# Patient Record
Sex: FEMALE | Race: WHITE | NOT HISPANIC OR LATINO | Employment: STUDENT | URBAN - METROPOLITAN AREA
[De-identification: names, ages, dates, MRNs, and addresses within clinical notes are randomized per-mention and may not be internally consistent; named-entity substitution may affect disease eponyms.]

---

## 2023-11-11 ENCOUNTER — HOSPITAL ENCOUNTER (EMERGENCY)
Facility: HOSPITAL | Age: 23
Discharge: HOME | End: 2023-11-11
Payer: COMMERCIAL

## 2023-11-11 ENCOUNTER — PHARMACY VISIT (OUTPATIENT)
Dept: PHARMACY | Facility: CLINIC | Age: 23
End: 2023-11-11

## 2023-11-11 VITALS
BODY MASS INDEX: 23.88 KG/M2 | DIASTOLIC BLOOD PRESSURE: 84 MMHG | OXYGEN SATURATION: 100 % | SYSTOLIC BLOOD PRESSURE: 124 MMHG | HEIGHT: 65 IN | WEIGHT: 143.3 LBS | TEMPERATURE: 97.7 F | RESPIRATION RATE: 16 BRPM

## 2023-11-11 DIAGNOSIS — Z34.90 PREGNANCY, UNSPECIFIED GESTATIONAL AGE (HHS-HCC): Primary | ICD-10-CM

## 2023-11-11 DIAGNOSIS — R11.2 NAUSEA AND VOMITING, UNSPECIFIED VOMITING TYPE: ICD-10-CM

## 2023-11-11 LAB
APPEARANCE UR: CLEAR
BILIRUB UR STRIP.AUTO-MCNC: NEGATIVE MG/DL
COLOR UR: NORMAL
GLUCOSE UR STRIP.AUTO-MCNC: NEGATIVE MG/DL
HOLD SPECIMEN: NORMAL
KETONES UR STRIP.AUTO-MCNC: NEGATIVE MG/DL
LEUKOCYTE ESTERASE UR QL STRIP.AUTO: NEGATIVE
NITRITE UR QL STRIP.AUTO: NEGATIVE
PH UR STRIP.AUTO: 6 [PH]
PROT UR STRIP.AUTO-MCNC: NEGATIVE MG/DL
RBC # UR STRIP.AUTO: NEGATIVE /UL
SP GR UR STRIP.AUTO: 1.01
UROBILINOGEN UR STRIP.AUTO-MCNC: <2 MG/DL

## 2023-11-11 PROCEDURE — 99283 EMERGENCY DEPT VISIT LOW MDM: CPT

## 2023-11-11 PROCEDURE — 81003 URINALYSIS AUTO W/O SCOPE: CPT

## 2023-11-11 PROCEDURE — 99285 EMERGENCY DEPT VISIT HI MDM: CPT

## 2023-11-11 PROCEDURE — 99284 EMERGENCY DEPT VISIT MOD MDM: CPT

## 2023-11-11 PROCEDURE — RXMED WILLOW AMBULATORY MEDICATION CHARGE

## 2023-11-11 RX ORDER — ACETAMINOPHEN 325 MG/1
650 TABLET ORAL EVERY 6 HOURS PRN
Qty: 20 TABLET | Refills: 0 | Status: SHIPPED | OUTPATIENT
Start: 2023-11-11 | End: 2023-11-16

## 2023-11-11 RX ORDER — ONDANSETRON 4 MG/1
4 TABLET, FILM COATED ORAL EVERY 6 HOURS
Qty: 12 TABLET | Refills: 0 | Status: SHIPPED | OUTPATIENT
Start: 2023-11-11 | End: 2023-11-14

## 2023-11-11 RX ORDER — ACETAMINOPHEN 325 MG/1
650 TABLET ORAL ONCE
Status: COMPLETED | OUTPATIENT
Start: 2023-11-11 | End: 2023-11-11

## 2023-11-11 RX ORDER — ACETAMINOPHEN 325 MG/1
650 TABLET ORAL EVERY 6 HOURS PRN
Qty: 20 TABLET | Refills: 0 | OUTPATIENT
Start: 2023-11-11 | End: 2024-02-27

## 2023-11-11 RX ORDER — ONDANSETRON 4 MG/1
4 TABLET, FILM COATED ORAL EVERY 6 HOURS
Qty: 12 TABLET | Refills: 0 | OUTPATIENT
Start: 2023-11-11

## 2023-11-11 RX ADMIN — ACETAMINOPHEN 650 MG: 325 TABLET ORAL at 11:23

## 2023-11-11 ASSESSMENT — COLUMBIA-SUICIDE SEVERITY RATING SCALE - C-SSRS
6. HAVE YOU EVER DONE ANYTHING, STARTED TO DO ANYTHING, OR PREPARED TO DO ANYTHING TO END YOUR LIFE?: NO
1. IN THE PAST MONTH, HAVE YOU WISHED YOU WERE DEAD OR WISHED YOU COULD GO TO SLEEP AND NOT WAKE UP?: NO
2. HAVE YOU ACTUALLY HAD ANY THOUGHTS OF KILLING YOURSELF?: NO

## 2023-11-11 NOTE — ED PROVIDER NOTES
HPI   Chief Complaint   Patient presents with   • other     Pregnancy test       24-year-old female with no significant history presents for chief complaint of positive pregnancy test.  States that she would like .  LMP 10/12.  G1, P0.  States that she had no intentions of keeping the child and would like to have it aborted as soon as possible.  Endorses some nausea without vomiting.  Endorses some low back and lower abdominal discomfort without rnoaldo pain.  Denies vaginal bleeding or discharge.  Denies fever, chills, myalgia.                          No data recorded                Patient History   No past medical history on file.  No past surgical history on file.  No family history on file.  Social History     Tobacco Use   • Smoking status: Not on file   • Smokeless tobacco: Not on file   Substance Use Topics   • Alcohol use: Not on file   • Drug use: Not on file       Physical Exam   ED Triage Vitals [23 1046]   Temp Pulse Resp BP   36.5 °C (97.7 °F) -- 16 124/84      SpO2 Temp src Heart Rate Source Patient Position   100 % -- Monitor --      BP Location FiO2 (%)     Right arm --       Physical Exam  Vitals and nursing note reviewed.   Constitutional:       General: She is not in acute distress.     Appearance: She is well-developed.   HENT:      Head: Normocephalic and atraumatic.   Eyes:      Conjunctiva/sclera: Conjunctivae normal.   Cardiovascular:      Rate and Rhythm: Normal rate and regular rhythm.      Heart sounds: No murmur heard.  Pulmonary:      Effort: Pulmonary effort is normal. No respiratory distress.      Breath sounds: Normal breath sounds.   Abdominal:      General: There is no distension.      Palpations: Abdomen is soft.      Tenderness: There is no abdominal tenderness. There is no right CVA tenderness, left CVA tenderness or guarding. Negative signs include Perez's sign and McBurney's sign.   Musculoskeletal:         General: No swelling.      Cervical back: Neck supple.    Skin:     General: Skin is warm and dry.      Capillary Refill: Capillary refill takes less than 2 seconds.   Neurological:      Mental Status: She is alert.   Psychiatric:         Mood and Affect: Mood normal.         ED Course & MDM   Diagnoses as of 23 1226   Pregnancy, unspecified gestational age       Medical Decision Making  Vital signs reviewed, unremarkable at this time.  The patient is well-appearing and in no apparent distress.  Speaks full sentences without difficulty.  Urinalysis obtained and sent.  Urine pregnancy test positive.  Patient has no intention of keeping the pregnancy, per her, and would like an .  I was able to call OB/GYN resident.  Resident Alexander gave me phone number for  intake clinic at [address] [facility]. this was all verbalized to the patient and also given discharge note.  Tylenol given for symptom management.  Advised to call the soon as possible.  I also encouraged her to follow-up with women's health and phone numbers given for [place] location.  Encouraged her to make the appointment soon as possible.  She denies any red flag symptoms at this point and has normal pregnancy symptoms at this point.  No bleeding or discharge.  No severe pain or tenderness.  No fevers.  Urinalysis shows no evidence of UTI.  Patient was given all of this information as well as prescription for Tylenol and Zofran.  Encouraged her to return with any new or worsening symptoms and to follow-up as soon as possible.  Patient in agreement with this plan.  Discharged stable condition.        Procedure  Procedures     Gordo Montez, VIPUL-CNP  23 1226

## 2023-11-11 NOTE — DISCHARGE INSTRUCTIONS
You were seen today and diagnosed with positive pregnancy.  You requested options for pregnancy termination.  Please follow-up as soon as possible at the [facility]  clinic at [address] [facility].  Their phone number is [phone].  Please also follow-up with women's health.

## 2023-11-30 ENCOUNTER — HOSPITAL ENCOUNTER (EMERGENCY)
Facility: HOSPITAL | Age: 23
Discharge: HOME | End: 2023-11-30
Payer: COMMERCIAL

## 2023-11-30 VITALS
TEMPERATURE: 99.4 F | SYSTOLIC BLOOD PRESSURE: 110 MMHG | HEIGHT: 65 IN | OXYGEN SATURATION: 100 % | DIASTOLIC BLOOD PRESSURE: 68 MMHG | HEART RATE: 72 BPM | WEIGHT: 155 LBS | BODY MASS INDEX: 25.83 KG/M2 | RESPIRATION RATE: 19 BRPM

## 2023-11-30 DIAGNOSIS — M54.50 ACUTE BILATERAL LOW BACK PAIN WITHOUT SCIATICA: Primary | ICD-10-CM

## 2023-11-30 LAB
APPEARANCE UR: ABNORMAL
BILIRUB UR STRIP.AUTO-MCNC: NEGATIVE MG/DL
COLOR UR: YELLOW
GLUCOSE UR STRIP.AUTO-MCNC: NEGATIVE MG/DL
HOLD SPECIMEN: NORMAL
KETONES UR STRIP.AUTO-MCNC: NEGATIVE MG/DL
LEUKOCYTE ESTERASE UR QL STRIP.AUTO: NEGATIVE
NITRITE UR QL STRIP.AUTO: NEGATIVE
PH UR STRIP.AUTO: 5 [PH]
PROT UR STRIP.AUTO-MCNC: NEGATIVE MG/DL
RBC # UR STRIP.AUTO: NEGATIVE /UL
SARS-COV-2 RNA RESP QL NAA+PROBE: NOT DETECTED
SP GR UR STRIP.AUTO: 1.02
UROBILINOGEN UR STRIP.AUTO-MCNC: <2 MG/DL

## 2023-11-30 PROCEDURE — 99284 EMERGENCY DEPT VISIT MOD MDM: CPT

## 2023-11-30 PROCEDURE — 81003 URINALYSIS AUTO W/O SCOPE: CPT | Performed by: PHYSICIAN ASSISTANT

## 2023-11-30 PROCEDURE — 99284 EMERGENCY DEPT VISIT MOD MDM: CPT | Performed by: PHYSICIAN ASSISTANT

## 2023-11-30 PROCEDURE — 87635 SARS-COV-2 COVID-19 AMP PRB: CPT | Performed by: PHYSICIAN ASSISTANT

## 2023-11-30 PROCEDURE — 99283 EMERGENCY DEPT VISIT LOW MDM: CPT | Performed by: PHYSICIAN ASSISTANT

## 2023-11-30 RX ORDER — METHOCARBAMOL 500 MG/1
500 TABLET, FILM COATED ORAL 3 TIMES DAILY PRN
Qty: 21 TABLET | Refills: 0 | Status: SHIPPED | OUTPATIENT
Start: 2023-11-30 | End: 2023-12-07

## 2023-11-30 RX ORDER — ACETAMINOPHEN 500 MG
1000 TABLET ORAL EVERY 8 HOURS PRN
Qty: 30 TABLET | Refills: 0 | Status: SHIPPED | OUTPATIENT
Start: 2023-11-30 | End: 2023-12-10

## 2023-11-30 RX ORDER — IBUPROFEN 600 MG/1
600 TABLET ORAL EVERY 6 HOURS PRN
Qty: 28 TABLET | Refills: 0 | Status: SHIPPED | OUTPATIENT
Start: 2023-11-30 | End: 2023-12-07

## 2023-11-30 RX ORDER — ACETAMINOPHEN 325 MG/1
975 TABLET ORAL ONCE
Status: COMPLETED | OUTPATIENT
Start: 2023-11-30 | End: 2023-11-30

## 2023-11-30 RX ADMIN — ACETAMINOPHEN 975 MG: 325 TABLET ORAL at 20:28

## 2023-11-30 ASSESSMENT — COLUMBIA-SUICIDE SEVERITY RATING SCALE - C-SSRS
2. HAVE YOU ACTUALLY HAD ANY THOUGHTS OF KILLING YOURSELF?: NO
6. HAVE YOU EVER DONE ANYTHING, STARTED TO DO ANYTHING, OR PREPARED TO DO ANYTHING TO END YOUR LIFE?: NO
1. IN THE PAST MONTH, HAVE YOU WISHED YOU WERE DEAD OR WISHED YOU COULD GO TO SLEEP AND NOT WAKE UP?: NO

## 2023-11-30 ASSESSMENT — PAIN DESCRIPTION - ONSET: ONSET: GRADUAL

## 2023-11-30 ASSESSMENT — PAIN DESCRIPTION - LOCATION: LOCATION: BACK

## 2023-11-30 ASSESSMENT — PAIN SCALES - GENERAL: PAINLEVEL_OUTOF10: 8

## 2023-11-30 ASSESSMENT — PAIN DESCRIPTION - PROGRESSION: CLINICAL_PROGRESSION: NOT CHANGED

## 2023-11-30 ASSESSMENT — PAIN - FUNCTIONAL ASSESSMENT: PAIN_FUNCTIONAL_ASSESSMENT: 0-10

## 2023-11-30 ASSESSMENT — PAIN DESCRIPTION - DESCRIPTORS: DESCRIPTORS: SORE

## 2023-11-30 NOTE — Clinical Note
Stephane Buckley was seen and treated in our emergency department on 11/30/2023.  She may return to work on 12/02/2023.       If you have any questions or concerns, please don't hesitate to call.      Renea Salas PA-C

## 2023-12-01 NOTE — ED PROVIDER NOTES
Emergency Department Encounter  [facility] EMERGENCY MEDICINE    Patient: Stephane Buckley  MRN: [id number]  : 2000  Date of Evaluation: 2023  ED Provider: Renea Salas PA-C      Chief Complaint       Chief Complaint   Patient presents with    COVID-19 Screening     HPI    Stephane Buckley is a 23 y.o. female who presents to the emergency department complaining of COVID exposure.  Patient states she is having significant low back pain, no radiation down her lower extremities.  No associated bowel or bladder incontinence or retention, saddle anesthesia, numbness or tingling, weakness, inability to ambulate.  Not taking any medications at home for her complaints.  Notes that she is also had a mild, intermittent frontal headache.  Did have a positive COVID exposure prior to onset of her symptoms.  Not the worst headache of her life.  Endorses having an elective  9 days ago.  Denies having any heavy bleeding, stopped bleeding several days ago.  No associated chest pain, shortness of breath, dizziness, abdominal pain, nausea or vomiting, diarrhea, dysuria or hematuria, changes in urinary frequency urgency, rash or cough.    ROS:     Review of Systems  14 systems reviewed and otherwise acutely negative except as in the HPI.    Past History   History reviewed. No pertinent past medical history.  History reviewed. No pertinent surgical history.  Social History     Socioeconomic History    Marital status: Single     Spouse name: None    Number of children: None    Years of education: None    Highest education level: None   Occupational History    None   Tobacco Use    Smoking status: Unknown    Smokeless tobacco: None   Substance and Sexual Activity    Alcohol use: None    Drug use: None    Sexual activity: None   Other Topics Concern    None   Social History Narrative    None     Social Determinants of Health     Financial Resource Strain: Not on file   Food Insecurity:  Not on file   Transportation Needs: Not on file   Physical Activity: Not on file   Stress: Not on file   Social Connections: Not on file   Intimate Partner Violence: Not on file   Housing Stability: Not on file       Medications/Allergies     Previous Medications    ACETAMINOPHEN (PAIN RELIEVER, ACETAMINOPHEN,) 325 MG TABLET    Take 2 tablets (650 mg) by mouth every 6 hours if needed for mild pain (1-3).    ONDANSETRON (ZOFRAN) 4 MG TABLET    Take 1 tablet (4 mg) by mouth every 6 hours.     No Known Allergies     Physical Exam       ED Triage Vitals [11/2001]   Temp Heart Rate Resp BP   37.4 °C (99.4 °F) 72 19 110/68      SpO2 Temp Source Heart Rate Source Patient Position   100 % Temporal -- --      BP Location FiO2 (%)     -- --         Physical Exam    Physical Exam:     VS: As documented in the triage note and EMR flowsheet from this visit were reviewed.    Appearance: Alert, oriented, cooperative, in no acute distress. Well nourished & well hydrated.    Skin: Warm, intact and dry.     Neck: Supple, without meningismus. No lymphadenopathy. No midline or paraspinal tenderness    Pulmonary: Clear bilaterally with good chest wall excursion. No rales, rhonchi or wheezing. No accessory muscle use or stridor. Nonlabored breathing, no supplemental oxygen.     Cardiac: Normal S1, S2    Abdomen: Soft, nontender, active bowel sounds. No palpable organomegaly. No rebound or guarding. No CVA tenderness.    Musculoskeletal: Spontaneously moving all extremities without limitation. No midline or paraspinal tenderness. Extremities warm and well-perfused, capillary refill less than 2 seconds. Pulses full and equal.     Neurological: Normal sensation, no weakness, no focal findings identified. Ambulating without assistance with steady gait, non-ataxic.    Diagnostics   Labs:  Labs Reviewed   URINALYSIS WITH REFLEX MICROSCOPIC AND CULTURE - Abnormal       Result Value    Color, Urine Yellow      Appearance, Urine Hazy (*)   "   Specific Gravity, Urine 1.017      pH, Urine 5.0      Protein, Urine NEGATIVE      Glucose, Urine NEGATIVE      Blood, Urine NEGATIVE      Ketones, Urine NEGATIVE      Bilirubin, Urine NEGATIVE      Urobilinogen, Urine <2.0      Nitrite, Urine NEGATIVE      Leukocyte Esterase, Urine NEGATIVE     SARS-COV-2 PCR, SYMPTOMATIC - Normal    Coronavirus 2019, PCR Not Detected      Narrative:     This assay has received FDA Emergency Use Authorization (EUA) and is only authorized for the duration of time that circumstances exist to justify the authorization of the emergency use of in vitro diagnostic tests for the detection of SARS-CoV-2 virus and/or diagnosis of COVID-19 infection under section 564(b)(1) of the Act, 21 U.S.C. 360bbb-3(b)(1). This assay is an in vitro diagnostic nucleic acid amplification test for the qualitative detection of SARS-CoV-2 from nasopharyngeal specimens and has been validated for use at [facility]. Negative results do not preclude COVID-19 infections and should not be used as the sole basis for diagnosis, treatment, or other management decisions.     URINALYSIS WITH REFLEX MICROSCOPIC AND CULTURE    Narrative:     The following orders were created for panel order Urinalysis with Reflex Microscopic and Culture.  Procedure                               Abnormality         Status                     ---------                               -----------         ------                     Urinalysis with Reflex M...[[id number]]  Abnormal            Final result               Extra Urine Gray Tube[[id number]]                            In process                   Please view results for these tests on the individual orders.   EXTRA URINE GRAY TUBE       ED Course   Visit Vitals  /68   Pulse 72   Temp 37.4 °C (99.4 °F) (Temporal)   Resp 19   Ht 1.651 m (5' 5\")   Wt 70.3 kg (155 lb)   LMP  (LMP Unknown)   SpO2 100%   Breastfeeding Unknown   BMI 25.79 kg/m²   OB Status " Having periods   Smoking Status Unknown   BSA 1.8 m²     Medications   acetaminophen (Tylenol) tablet 975 mg (975 mg oral Given 11/30/23 2028)       Medical Decision Making   COVID swab obtained.   UA obtained and sent to evaluate for low back pain. Noted to have low grade fever - given oral tylenol for this and her low back pain.  UA noninfected. COVID negative. Follow up with PCP as needed. Rx for tylenol motrin and robaxin as needed.      Final Impression      1. Acute bilateral low back pain without sciatica          DISPOSITION  Disposition: discharge  Patient condition is: Stable    Comment: Please note this report has been produced using speech recognition software and may contain errors related to that system including errors in grammar, punctuation, and spelling, as well as words and phrases that may be inappropriate.  If there are any questions or concerns please feel free to contact the dictating provider for clarification.    VLAD Peña PA-C  11/30/23 5202

## 2023-12-01 NOTE — ED TRIAGE NOTES
Enters ED c/o back & headache pain rated 8/10, one week after . Pt also endorses positive COVID-19 exposure requesting testing. Denies cough, rhinorrhea, nausea, vomiting, diarrhea.

## 2023-12-26 ENCOUNTER — HOSPITAL ENCOUNTER (EMERGENCY)
Facility: HOSPITAL | Age: 23
Discharge: HOME | End: 2023-12-26
Payer: COMMERCIAL

## 2023-12-26 VITALS
TEMPERATURE: 97.2 F | RESPIRATION RATE: 18 BRPM | SYSTOLIC BLOOD PRESSURE: 121 MMHG | DIASTOLIC BLOOD PRESSURE: 74 MMHG | OXYGEN SATURATION: 100 % | HEART RATE: 69 BPM

## 2023-12-26 DIAGNOSIS — M54.50 BILATERAL LOW BACK PAIN WITHOUT SCIATICA, UNSPECIFIED CHRONICITY: Primary | ICD-10-CM

## 2023-12-26 LAB
ALBUMIN SERPL BCP-MCNC: 4.6 G/DL (ref 3.4–5)
ALP SERPL-CCNC: 74 U/L (ref 33–110)
ALT SERPL W P-5'-P-CCNC: 22 U/L (ref 7–45)
ANION GAP SERPL CALC-SCNC: 12 MMOL/L (ref 10–20)
APPEARANCE UR: CLEAR
AST SERPL W P-5'-P-CCNC: 20 U/L (ref 9–39)
B-HCG SERPL-ACNC: <3 MIU/ML
BASOPHILS # BLD AUTO: 0.02 X10*3/UL (ref 0–0.1)
BASOPHILS NFR BLD AUTO: 0.3 %
BILIRUB SERPL-MCNC: 0.2 MG/DL (ref 0–1.2)
BILIRUB UR STRIP.AUTO-MCNC: NEGATIVE MG/DL
BUN SERPL-MCNC: 6 MG/DL (ref 6–23)
CALCIUM SERPL-MCNC: 9.9 MG/DL (ref 8.6–10.6)
CHLORIDE SERPL-SCNC: 105 MMOL/L (ref 98–107)
CO2 SERPL-SCNC: 24 MMOL/L (ref 21–32)
COLOR UR: NORMAL
CREAT SERPL-MCNC: 0.59 MG/DL (ref 0.5–1.05)
EOSINOPHIL # BLD AUTO: 0.07 X10*3/UL (ref 0–0.7)
EOSINOPHIL NFR BLD AUTO: 0.9 %
ERYTHROCYTE [DISTWIDTH] IN BLOOD BY AUTOMATED COUNT: 13.4 % (ref 11.5–14.5)
GFR SERPL CREATININE-BSD FRML MDRD: >90 ML/MIN/1.73M*2
GLUCOSE SERPL-MCNC: 91 MG/DL (ref 74–99)
GLUCOSE UR STRIP.AUTO-MCNC: NEGATIVE MG/DL
HCT VFR BLD AUTO: 42.6 % (ref 36–46)
HGB BLD-MCNC: 14 G/DL (ref 12–16)
IMM GRANULOCYTES # BLD AUTO: 0.02 X10*3/UL (ref 0–0.7)
IMM GRANULOCYTES NFR BLD AUTO: 0.3 % (ref 0–0.9)
KETONES UR STRIP.AUTO-MCNC: NEGATIVE MG/DL
LEUKOCYTE ESTERASE UR QL STRIP.AUTO: NEGATIVE
LYMPHOCYTES # BLD AUTO: 1.83 X10*3/UL (ref 1.2–4.8)
LYMPHOCYTES NFR BLD AUTO: 24.8 %
MCH RBC QN AUTO: 27 PG (ref 26–34)
MCHC RBC AUTO-ENTMCNC: 32.9 G/DL (ref 32–36)
MCV RBC AUTO: 82 FL (ref 80–100)
MONOCYTES # BLD AUTO: 0.36 X10*3/UL (ref 0.1–1)
MONOCYTES NFR BLD AUTO: 4.9 %
NEUTROPHILS # BLD AUTO: 5.08 X10*3/UL (ref 1.2–7.7)
NEUTROPHILS NFR BLD AUTO: 68.8 %
NITRITE UR QL STRIP.AUTO: NEGATIVE
NRBC BLD-RTO: 0 /100 WBCS (ref 0–0)
PH UR STRIP.AUTO: 6 [PH]
PLATELET # BLD AUTO: 231 X10*3/UL (ref 150–450)
POTASSIUM SERPL-SCNC: 4 MMOL/L (ref 3.5–5.3)
PREGNANCY TEST URINE, POC: NEGATIVE
PROT SERPL-MCNC: 8.1 G/DL (ref 6.4–8.2)
PROT UR STRIP.AUTO-MCNC: NEGATIVE MG/DL
RBC # BLD AUTO: 5.19 X10*6/UL (ref 4–5.2)
RBC # UR STRIP.AUTO: NEGATIVE /UL
SODIUM SERPL-SCNC: 137 MMOL/L (ref 136–145)
SP GR UR STRIP.AUTO: 1.01
UROBILINOGEN UR STRIP.AUTO-MCNC: <2 MG/DL
WBC # BLD AUTO: 7.4 X10*3/UL (ref 4.4–11.3)

## 2023-12-26 PROCEDURE — 80053 COMPREHEN METABOLIC PANEL: CPT | Performed by: NURSE PRACTITIONER

## 2023-12-26 PROCEDURE — 2500000004 HC RX 250 GENERAL PHARMACY W/ HCPCS (ALT 636 FOR OP/ED): Performed by: NURSE PRACTITIONER

## 2023-12-26 PROCEDURE — 85025 COMPLETE CBC W/AUTO DIFF WBC: CPT | Performed by: NURSE PRACTITIONER

## 2023-12-26 PROCEDURE — 99284 EMERGENCY DEPT VISIT MOD MDM: CPT

## 2023-12-26 PROCEDURE — 36415 COLL VENOUS BLD VENIPUNCTURE: CPT | Performed by: NURSE PRACTITIONER

## 2023-12-26 PROCEDURE — 99284 EMERGENCY DEPT VISIT MOD MDM: CPT | Performed by: NURSE PRACTITIONER

## 2023-12-26 PROCEDURE — 81003 URINALYSIS AUTO W/O SCOPE: CPT | Performed by: NURSE PRACTITIONER

## 2023-12-26 PROCEDURE — 81025 URINE PREGNANCY TEST: CPT

## 2023-12-26 PROCEDURE — 96374 THER/PROPH/DIAG INJ IV PUSH: CPT

## 2023-12-26 PROCEDURE — 84702 CHORIONIC GONADOTROPIN TEST: CPT | Performed by: NURSE PRACTITIONER

## 2023-12-26 RX ORDER — KETOROLAC TROMETHAMINE 15 MG/ML
15 INJECTION, SOLUTION INTRAMUSCULAR; INTRAVENOUS ONCE
Status: COMPLETED | OUTPATIENT
Start: 2023-12-26 | End: 2023-12-26

## 2023-12-26 RX ADMIN — KETOROLAC TROMETHAMINE 15 MG: 15 INJECTION, SOLUTION INTRAMUSCULAR; INTRAVENOUS at 22:28

## 2023-12-27 LAB — HOLD SPECIMEN: NORMAL

## 2023-12-27 NOTE — ED PROVIDER NOTES
Emergency Department Encounter  [facility] EMERGENCY MEDICINE    Patient: Stephane Buckley  MRN: [id number]  : 2000  Date of Evaluation: 2023  ED Provider: JENNY Willett      Chief Complaint       Chief Complaint   Patient presents with    Back Pain    MOOD SWING    Hot Flashes     Telida    (Location/Symptom, Timing/Onset, Context/Setting, Quality, Duration, Modifying Factors, Severity) Note limiting factors.   Limitations to History: none  Historian: self  Records reviewed: EMR inpatient and outpatient notes, Care Everywhere      Stephane Buckley is a 23 y.o. female who presents to the emergency department complaining of mood swings, back pain, hot flashes that started within this last week.  Was concerned since she did not have a follow-up after having an elective  on .  Denies any vaginal bleeding, vaginal discharge.  Has not had a menstrual cycle since the .  Denies any fevers, chills, dizziness, syncope.  States that she feels like she may be dehydrated.  Denies any nausea, vomiting.    ROS:     Review of Systems  14 systems reviewed and otherwise acutely negative except as in the Telida.          Past History   No past medical history on file.  No past surgical history on file.  Social History     Socioeconomic History    Marital status: Single     Spouse name: Not on file    Number of children: Not on file    Years of education: Not on file    Highest education level: Not on file   Occupational History    Not on file   Tobacco Use    Smoking status: Unknown    Smokeless tobacco: Not on file   Substance and Sexual Activity    Alcohol use: Not on file    Drug use: Not on file    Sexual activity: Not on file   Other Topics Concern    Not on file   Social History Narrative    Not on file     Social Determinants of Health     Financial Resource Strain: Not on file   Food Insecurity: Not on file   Transportation Needs: Not on file   Physical  Activity: Not on file   Stress: Not on file   Social Connections: Not on file   Intimate Partner Violence: Not on file   Housing Stability: Not on file       Medications/Allergies     Previous Medications    ACETAMINOPHEN (PAIN RELIEVER, ACETAMINOPHEN,) 325 MG TABLET    Take 2 tablets (650 mg) by mouth every 6 hours if needed for mild pain (1-3).    METHOCARBAMOL (ROBAXIN) 500 MG TABLET    Take 1 tablet (500 mg) by mouth 3 times a day as needed for muscle spasms for up to 7 days.    ONDANSETRON (ZOFRAN) 4 MG TABLET    Take 1 tablet (4 mg) by mouth every 6 hours.     No Known Allergies     Physical Exam       ED Triage Vitals [12/26/23 2056]   Temp Heart Rate Resp BP   36.2 °C (97.2 °F) 69 18 121/74      SpO2 Temp src Heart Rate Source Patient Position   100 % -- -- --      BP Location FiO2 (%)     -- --         Physical Exam    GENERAL:  The patient appears nourished and normally developed. Vital signs as documented.     HEENT:  Head normocephalic, atraumatic, EOMs intact, PERRLA, Mucous membranes moist. Nares patent without copious rhinorrhea.  No lymphadenopathy.    PULMONARY:  Lungs are clear to auscultation, without any respiratory distress. Able to speak full sentences, no accessory muscle use    CARDIAC:   Normal rate. No murmurs, rubs or gallops    ABDOMEN:  Soft, non distended, non tender, BS positive x 4 quadrants, No rebound or guarding, no peritoneal signs, no CVA tenderness, no masses or organomegaly    MUSCULOSKELETAL:   Able to ambulate, Non edematous, with no obvious deformities. Pulses intact distal    SKIN:   Good color, with no significant rashes.  No pallor.    NEURO:  No obvious neurological deficits, normal sensation and strength bilaterally.  Able to follow commands, NIH 0, CN 2-12 intact.        Diagnostics   Labs:  Labs Reviewed   COMPREHENSIVE METABOLIC PANEL - Normal       Result Value    Glucose 91      Sodium 137      Potassium 4.0      Chloride 105      Bicarbonate 24      Anion Gap 12       Urea Nitrogen 6      Creatinine 0.59      eGFR >90      Calcium 9.9      Albumin 4.6      Alkaline Phosphatase 74      Total Protein 8.1      AST 20      Bilirubin, Total 0.2      ALT 22     HUMAN CHORIONIC GONADOTROPIN, SERUM QUANTITATIVE - Normal    HCG, Beta-Quantitative <3      Narrative:     Total HCG measurement is performed using the Siemens [employer] immunoassay which detects intact HCG and free beta HCG subunit.  This test is not indicated for use as a tumor marker.  HCG testing is performed using a different test methodology at [facility] than other [facility]. Direct result comparison  should only be made within the same method.          URINALYSIS WITH REFLEX CULTURE AND MICROSCOPIC - Normal    Color, Urine Straw      Appearance, Urine Clear      Specific Gravity, Urine 1.009      pH, Urine 6.0      Protein, Urine NEGATIVE      Glucose, Urine NEGATIVE      Blood, Urine NEGATIVE      Ketones, Urine NEGATIVE      Bilirubin, Urine NEGATIVE      Urobilinogen, Urine <2.0      Nitrite, Urine NEGATIVE      Leukocyte Esterase, Urine NEGATIVE     POCT PREGNANCY, URINE - Normal    Preg Test, Ur Negative     CBC WITH AUTO DIFFERENTIAL    WBC 7.4      nRBC 0.0      RBC 5.19      Hemoglobin 14.0      Hematocrit 42.6      MCV 82      MCH 27.0      MCHC 32.9      RDW 13.4      Platelets 231      Neutrophils % 68.8      Immature Granulocytes %, Automated 0.3      Lymphocytes % 24.8      Monocytes % 4.9      Eosinophils % 0.9      Basophils % 0.3      Neutrophils Absolute 5.08      Immature Granulocytes Absolute, Automated 0.02      Lymphocytes Absolute 1.83      Monocytes Absolute 0.36      Eosinophils Absolute 0.07      Basophils Absolute 0.02     URINALYSIS WITH REFLEX CULTURE AND MICROSCOPIC    Narrative:     The following orders were created for panel order Urinalysis with Reflex Culture and Microscopic.  Procedure                               Abnormality         Status                      ---------                               -----------         ------                     Urinalysis with Reflex C...[[id number]]  Normal              Final result               Extra Urine Gray Tube[[id number]]                            In process                   Please view results for these tests on the individual orders.   EXTRA URINE GRAY TUBE     Radiographs:  No orders to display             Assessment   In brief, Stephane Buckley is a 23 y.o. female who presented to the emergency department for mood swings, cramps, back pain, feelings of dehydration for the last week.  Status post 1 month from elective .    Plan   IV, lab work, pregnancy test    Differentials   Retained fetal tissue  Menstrual cramps  Viral illness    ED Course     Diagnoses as of 23   Bilateral low back pain without sciatica, unspecified chronicity       Visit Vitals  /74   Pulse 69   Temp 36.2 °C (97.2 °F)   Resp 18   LMP  (LMP Unknown)   SpO2 100%   OB Status Having periods   Smoking Status Unknown       Medications   ketorolac (Toradol) injection 15 mg (15 mg intravenous Given 23)       Plan of care discussed, patient is well-appearing, in no distress, significant other was requesting for serum hCG, symptoms are likely due to hormones, likely about to get her next menstrual cycle after elective .  Has no fever, chills, no leukocytosis, serum hCG less than 3.  Feels improved with Toradol administration, will be discharged home in stable condition, educated on any worsening signs and symptoms to return to the emergency department      Final Impression      1. Bilateral low back pain without sciatica, unspecified chronicity          DISPOSITION  Disposition: Discharge  Patient condition is: Stable    Comment: Please note this report has been produced using speech recognition software and may contain errors related to that system including errors in grammar, punctuation, and spelling, as well  as words and phrases that may be inappropriate.  If there are any questions or concerns please feel free to contact the dictating provider for clarification.    VIPUL Willett-JENNY Beach  12/26/23 9690

## 2023-12-27 NOTE — ED TRIAGE NOTES
PT IS 1 MONTH S/P  AND STATES SINCE THEN SHE HAS BEEN EXPERIENCING MOOD SWINGS ANS HOT FLASHES. PT DENIES CONTINUED VAG BLEEDING, URINARY SYMPTOMS, FEVER, CHILLS. REPORTS BACK PAIN.

## 2024-02-26 ENCOUNTER — APPOINTMENT (OUTPATIENT)
Dept: RADIOLOGY | Facility: HOSPITAL | Age: 24
End: 2024-02-26
Payer: COMMERCIAL

## 2024-02-26 ENCOUNTER — HOSPITAL ENCOUNTER (EMERGENCY)
Facility: HOSPITAL | Age: 24
Discharge: HOME | End: 2024-02-27
Attending: EMERGENCY MEDICINE
Payer: COMMERCIAL

## 2024-02-26 DIAGNOSIS — Z32.00 POSSIBLE PREGNANCY, NOT YET CONFIRMED: Primary | ICD-10-CM

## 2024-02-26 DIAGNOSIS — B34.9 VIRAL ILLNESS: ICD-10-CM

## 2024-02-26 LAB
ABO GROUP (TYPE) IN BLOOD: NORMAL
ALBUMIN SERPL BCP-MCNC: 4 G/DL (ref 3.4–5)
ALP SERPL-CCNC: 59 U/L (ref 33–110)
ALT SERPL W P-5'-P-CCNC: 13 U/L (ref 7–45)
ANION GAP SERPL CALC-SCNC: 9 MMOL/L (ref 10–20)
ANTIBODY SCREEN: NORMAL
APPEARANCE UR: CLEAR
AST SERPL W P-5'-P-CCNC: 13 U/L (ref 9–39)
B-HCG SERPL-ACNC: 139 MIU/ML
BACTERIA #/AREA URNS AUTO: ABNORMAL /HPF
BASOPHILS # BLD AUTO: 0.02 X10*3/UL (ref 0–0.1)
BASOPHILS NFR BLD AUTO: 0.2 %
BILIRUB SERPL-MCNC: 0.2 MG/DL (ref 0–1.2)
BILIRUB UR STRIP.AUTO-MCNC: NEGATIVE MG/DL
BUN SERPL-MCNC: 15 MG/DL (ref 6–23)
CALCIUM SERPL-MCNC: 9.4 MG/DL (ref 8.6–10.6)
CHLORIDE SERPL-SCNC: 107 MMOL/L (ref 98–107)
CO2 SERPL-SCNC: 24 MMOL/L (ref 21–32)
COLOR UR: ABNORMAL
CREAT SERPL-MCNC: 0.79 MG/DL (ref 0.5–1.05)
EGFRCR SERPLBLD CKD-EPI 2021: >90 ML/MIN/1.73M*2
EOSINOPHIL # BLD AUTO: 0.08 X10*3/UL (ref 0–0.7)
EOSINOPHIL NFR BLD AUTO: 0.9 %
ERYTHROCYTE [DISTWIDTH] IN BLOOD BY AUTOMATED COUNT: 13.5 % (ref 11.5–14.5)
FLUAV RNA RESP QL NAA+PROBE: NOT DETECTED
FLUBV RNA RESP QL NAA+PROBE: NOT DETECTED
GLUCOSE SERPL-MCNC: 87 MG/DL (ref 74–99)
GLUCOSE UR STRIP.AUTO-MCNC: NORMAL MG/DL
HCT VFR BLD AUTO: 34.9 % (ref 36–46)
HGB BLD-MCNC: 12.2 G/DL (ref 12–16)
IMM GRANULOCYTES # BLD AUTO: 0.02 X10*3/UL (ref 0–0.7)
IMM GRANULOCYTES NFR BLD AUTO: 0.2 % (ref 0–0.9)
KETONES UR STRIP.AUTO-MCNC: NEGATIVE MG/DL
LEUKOCYTE ESTERASE UR QL STRIP.AUTO: ABNORMAL
LYMPHOCYTES # BLD AUTO: 2.38 X10*3/UL (ref 1.2–4.8)
LYMPHOCYTES NFR BLD AUTO: 26.8 %
MCH RBC QN AUTO: 27.9 PG (ref 26–34)
MCHC RBC AUTO-ENTMCNC: 35 G/DL (ref 32–36)
MCV RBC AUTO: 80 FL (ref 80–100)
MONOCYTES # BLD AUTO: 0.58 X10*3/UL (ref 0.1–1)
MONOCYTES NFR BLD AUTO: 6.5 %
MUCOUS THREADS #/AREA URNS AUTO: ABNORMAL /LPF
NEUTROPHILS # BLD AUTO: 5.79 X10*3/UL (ref 1.2–7.7)
NEUTROPHILS NFR BLD AUTO: 65.4 %
NITRITE UR QL STRIP.AUTO: NEGATIVE
NRBC BLD-RTO: 0 /100 WBCS (ref 0–0)
PH UR STRIP.AUTO: 5.5 [PH]
PLATELET # BLD AUTO: 215 X10*3/UL (ref 150–450)
POTASSIUM SERPL-SCNC: 4 MMOL/L (ref 3.5–5.3)
PREGNANCY TEST URINE, POC: POSITIVE
PROT SERPL-MCNC: 7.2 G/DL (ref 6.4–8.2)
PROT UR STRIP.AUTO-MCNC: NEGATIVE MG/DL
RBC # BLD AUTO: 4.37 X10*6/UL (ref 4–5.2)
RBC # UR STRIP.AUTO: NEGATIVE /UL
RBC #/AREA URNS AUTO: ABNORMAL /HPF
RH FACTOR (ANTIGEN D): NORMAL
RSV RNA RESP QL NAA+PROBE: NOT DETECTED
S PYO DNA THROAT QL NAA+PROBE: NOT DETECTED
SARS-COV-2 RNA RESP QL NAA+PROBE: NOT DETECTED
SODIUM SERPL-SCNC: 136 MMOL/L (ref 136–145)
SP GR UR STRIP.AUTO: 1.02
SQUAMOUS #/AREA URNS AUTO: ABNORMAL /HPF
UROBILINOGEN UR STRIP.AUTO-MCNC: NORMAL MG/DL
WBC # BLD AUTO: 8.9 X10*3/UL (ref 4.4–11.3)
WBC #/AREA URNS AUTO: ABNORMAL /HPF

## 2024-02-26 PROCEDURE — 81001 URINALYSIS AUTO W/SCOPE: CPT | Performed by: PHYSICIAN ASSISTANT

## 2024-02-26 PROCEDURE — 87651 STREP A DNA AMP PROBE: CPT | Performed by: PHYSICIAN ASSISTANT

## 2024-02-26 PROCEDURE — 87086 URINE CULTURE/COLONY COUNT: CPT | Performed by: PHYSICIAN ASSISTANT

## 2024-02-26 PROCEDURE — 86901 BLOOD TYPING SEROLOGIC RH(D): CPT | Performed by: PHYSICIAN ASSISTANT

## 2024-02-26 PROCEDURE — 76830 TRANSVAGINAL US NON-OB: CPT

## 2024-02-26 PROCEDURE — 76815 OB US LIMITED FETUS(S): CPT | Performed by: RADIOLOGY

## 2024-02-26 PROCEDURE — 76817 TRANSVAGINAL US OBSTETRIC: CPT | Performed by: RADIOLOGY

## 2024-02-26 PROCEDURE — 81025 URINE PREGNANCY TEST: CPT

## 2024-02-26 PROCEDURE — 85025 COMPLETE CBC W/AUTO DIFF WBC: CPT | Performed by: PHYSICIAN ASSISTANT

## 2024-02-26 PROCEDURE — 99285 EMERGENCY DEPT VISIT HI MDM: CPT | Mod: 25

## 2024-02-26 PROCEDURE — 84702 CHORIONIC GONADOTROPIN TEST: CPT | Performed by: PHYSICIAN ASSISTANT

## 2024-02-26 PROCEDURE — 99285 EMERGENCY DEPT VISIT HI MDM: CPT | Performed by: PHYSICIAN ASSISTANT

## 2024-02-26 PROCEDURE — 80053 COMPREHEN METABOLIC PANEL: CPT | Performed by: PHYSICIAN ASSISTANT

## 2024-02-26 PROCEDURE — 87637 SARSCOV2&INF A&B&RSV AMP PRB: CPT | Performed by: PHYSICIAN ASSISTANT

## 2024-02-26 PROCEDURE — 36415 COLL VENOUS BLD VENIPUNCTURE: CPT | Performed by: PHYSICIAN ASSISTANT

## 2024-02-26 RX ORDER — ACETAMINOPHEN 325 MG/1
975 TABLET ORAL ONCE
Status: COMPLETED | OUTPATIENT
Start: 2024-02-26 | End: 2024-02-26

## 2024-02-26 RX ADMIN — ACETAMINOPHEN 975 MG: 325 TABLET ORAL at 21:08

## 2024-02-26 ASSESSMENT — COLUMBIA-SUICIDE SEVERITY RATING SCALE - C-SSRS
1. IN THE PAST MONTH, HAVE YOU WISHED YOU WERE DEAD OR WISHED YOU COULD GO TO SLEEP AND NOT WAKE UP?: NO
2. HAVE YOU ACTUALLY HAD ANY THOUGHTS OF KILLING YOURSELF?: NO
6. HAVE YOU EVER DONE ANYTHING, STARTED TO DO ANYTHING, OR PREPARED TO DO ANYTHING TO END YOUR LIFE?: NO

## 2024-02-27 ENCOUNTER — PHARMACY VISIT (OUTPATIENT)
Dept: PHARMACY | Facility: CLINIC | Age: 24
End: 2024-02-27

## 2024-02-27 VITALS
RESPIRATION RATE: 21 BRPM | WEIGHT: 160 LBS | TEMPERATURE: 96.6 F | SYSTOLIC BLOOD PRESSURE: 115 MMHG | HEIGHT: 65 IN | OXYGEN SATURATION: 98 % | DIASTOLIC BLOOD PRESSURE: 71 MMHG | BODY MASS INDEX: 26.66 KG/M2 | HEART RATE: 60 BPM

## 2024-02-27 DIAGNOSIS — O36.80X0 PREGNANCY OF UNKNOWN ANATOMIC LOCATION (HHS-HCC): Primary | ICD-10-CM

## 2024-02-27 LAB
BACTERIA UR CULT: NORMAL
HOLD SPECIMEN: NORMAL

## 2024-02-27 PROCEDURE — 96374 THER/PROPH/DIAG INJ IV PUSH: CPT

## 2024-02-27 PROCEDURE — RXMED WILLOW AMBULATORY MEDICATION CHARGE

## 2024-02-27 PROCEDURE — 2500000004 HC RX 250 GENERAL PHARMACY W/ HCPCS (ALT 636 FOR OP/ED)

## 2024-02-27 PROCEDURE — 96375 TX/PRO/DX INJ NEW DRUG ADDON: CPT

## 2024-02-27 RX ORDER — ONDANSETRON HYDROCHLORIDE 2 MG/ML
4 INJECTION, SOLUTION INTRAVENOUS ONCE
Status: COMPLETED | OUTPATIENT
Start: 2024-02-27 | End: 2024-02-27

## 2024-02-27 RX ORDER — MORPHINE SULFATE 4 MG/ML
2 INJECTION INTRAVENOUS ONCE
Status: COMPLETED | OUTPATIENT
Start: 2024-02-27 | End: 2024-02-27

## 2024-02-27 RX ORDER — ACETAMINOPHEN 325 MG/1
975 TABLET ORAL EVERY 6 HOURS PRN
Qty: 84 TABLET | Refills: 0 | Status: SHIPPED | OUTPATIENT
Start: 2024-02-27 | End: 2024-03-05

## 2024-02-27 RX ORDER — ONDANSETRON 4 MG/1
4 TABLET, ORALLY DISINTEGRATING ORAL EVERY 8 HOURS PRN
Qty: 20 TABLET | Refills: 0 | Status: SHIPPED | OUTPATIENT
Start: 2024-02-27

## 2024-02-27 RX ADMIN — ONDANSETRON 4 MG: 2 INJECTION INTRAMUSCULAR; INTRAVENOUS at 01:31

## 2024-02-27 RX ADMIN — MORPHINE SULFATE 2 MG: 4 INJECTION INTRAVENOUS at 01:31

## 2024-02-27 ASSESSMENT — PAIN SCALES - GENERAL: PAINLEVEL_OUTOF10: 8

## 2024-02-27 NOTE — ED TRIAGE NOTES
Pt to the ED for flu like symptoms. PT endorsing fever, generalized body aches, sore eyes. Denies any significance medical hx.

## 2024-02-27 NOTE — DISCHARGE INSTRUCTIONS
Please report to any [facility] outpatient lab to have blood work drawn.  I have ordered a referral and provided office information for our OB/GYN's.  I recommend you follow-up with them as well as her primary care doctor regarding your symptoms.  I have sent prescriptions to the [facility] pharmacy for you.  Return to the emergency department for any new or worsening symptoms or for any other concerns.

## 2024-02-27 NOTE — PROGRESS NOTES
Handoff Note    I received Stephane Buckley in signout from Faby Barnes PA-C.  Please see the previous note for all HPI, PE and MDM up to the time of signout at 2300.    In brief Stephane Buckley is an 23 y.o. female presenting for   Chief Complaint   Patient presents with    Flu Symptoms   .      At the time of signout, the patient's disposition is pending transvaginal ultrasound.  The patient presents today with flulike symptoms including fevers, chills, cough.  She was incidentally found to be pregnant.  Point-of-care ultrasound did not show intrauterine pregnancy and therefore transvaginal ultrasound was ordered and is pending at the time of signout.  Rest of her workup is reviewed.  There is no leukocytosis or anemia on CBC.  Metabolic panel is unremarkable.  Flu COVID and RSV swabs are negative.  Urinalysis not consistent with signs of urinary tract infection.  Group A strep is also negative.  Pregnancy test is positive with a beta hCG level of 139.  Transvaginal ultrasound came back showing possible gestational sac without a yolk sac or fetal pole.  There is also possible left-sided hemorrhagic ovarian cyst.  Patient is still complaining of discomfort on the left side as well as some nausea.  She is remedicated with antiemetics and analgesia.  Discussed follow-up in 48 hours for beta-hCG level to trend.  Reached out to gynecology to help facilitate beta book planning.  She expressed her understanding.  Also recommended she follow-up with her primary care doctor as well as OB/GYN.  I ordered a referral provided follow-up information for OB/GYN clinic.  She expressed understanding and agreed with this plan.  She remained hemodynamically stable and is discharged home.      Throughout the ED stay, the patient was monitored and re-examined for any changes in stability or symptomatology.  The patient was instructed to return to the ED if any symptoms recurred, worsened, or if there was any additional  concerns.    ED Course:   ED Course as of 24   Mon 2024   2153 Preg Test, Ur(!): Positive  Patient's pregnancy test was found to be positive.  I did discuss this result with the patient in private.  She did not know she was pregnant.  She states that her first day of her last menstrual cycle was .  She states that she is sexually active with 1 partner and the last time they were sexually active she used Plan B 5 hours later.  She is not on any type of birth control and denies condom use.  She states that she has been pregnant once previously in 2023 where she had an elective .  Bedside ultrasound attempted, however I was unable to see a IUP so transvaginal ultrasound ordered as well as laboratory studies. [AW]      ED Course User Index  [AW] Faby Barnes PA-C         Diagnoses as of 24   Possible pregnancy, not yet confirmed   Viral illness         Patient seen by and discussed with attending emergency medicine physician, Dr. Crespo    Pt Disposition: Discharge    Procedures      Alex Pink DO  Emergency Medicine PGY-2  [facility]

## 2024-02-27 NOTE — ED PROVIDER NOTES
"This is a 23-year-old healthy female who presents to the ED with 1 week of flulike symptoms including nasal congestion, rhinorrhea, fevers and chills, generalized malaise, generalized bodyaches, minimally productive cough as well as a sore throat.  She also endorses having some mild suprapubic abdominal pain.  She denies any vaginal or urinary symptoms.  She denies any concern for pregnancy.  She denies any flank pain.  She denies nausea, vomiting, or diarrhea.  She tried taking over-the-counter medications at home without any relief of her symptoms.      History provided by:  Patient   used: No             Visit Vitals  /59   Pulse 66   Temp 35.9 °C (96.6 °F) (Tympanic)   Resp 20   Ht 1.651 m (5' 5\")   Wt 72.6 kg (160 lb)   SpO2 97%   BMI 26.63 kg/m²   OB Status Having periods   Smoking Status Unknown   BSA 1.82 m²          Physical Exam     Physical exam:   General: Vitals noted, no distress. Afebrile.   EENT:  Hearing grossly intact. Normal phonation. MMM. Airway patient. PERRL. EOMI. posterior oropharynx without erythema, edema, or exudates.  Normal phonation.  Tolerating own secretions.  Uvula midline.  No visualized peritonsillar abscess.  Neck: No midline tenderness or paraspinal tenderness. FROM.   Cardiac: Regular, rate, rhythm. Normal S1 and S2.  No murmurs, gallops, rubs.   Pulmonary: Good air exchange. Lungs clear bilaterally. No wheezes, rhonchi, rales. No accessory muscle use.   Abdomen: Soft, nonsurgical. Very mild suprapubic abdominal tenderness to palpation. No peritoneal signs. Normoactive bowel sounds.   Back: No CVA tenderness. No midline tenderness or paraspinal tenderness. No obvious deformity.   Extremities: No peripheral edema.  Full range of motion. Moves all extremities freely. No tenderness throughout extremities.   Skin: No rash. Warm and Dry.   Neuro: No focal neurologic deficits. CN 2-12 grossly intact. Sensation equal bilaterally. No weakness.         Labs " Reviewed   URINALYSIS WITH REFLEX CULTURE AND MICROSCOPIC - Abnormal       Result Value    Color, Urine Light-Yellow      Appearance, Urine Clear      Specific Gravity, Urine 1.018      pH, Urine 5.5      Protein, Urine NEGATIVE      Glucose, Urine Normal      Blood, Urine NEGATIVE      Ketones, Urine NEGATIVE      Bilirubin, Urine NEGATIVE      Urobilinogen, Urine Normal      Nitrite, Urine NEGATIVE      Leukocyte Esterase, Urine 75 [place]/µL (*)    HUMAN CHORIONIC GONADOTROPIN, SERUM QUANTITATIVE - Abnormal    HCG, Beta-Quantitative 139 (*)     Narrative:     Total HCG measurement is performed using the Siemens [employer] Power immunoassay which detects intact HCG and free beta HCG subunit.  This test is not indicated for use as a tumor marker.  HCG testing is performed using a different test methodology at [facility] than other [facility]. Direct result comparison  should only be made within the same method.          COMPREHENSIVE METABOLIC PANEL - Abnormal    Glucose 87      Sodium 136      Potassium 4.0      Chloride 107      Bicarbonate 24      Anion Gap 9 (*)     Urea Nitrogen 15      Creatinine 0.79      eGFR >90      Calcium 9.4      Albumin 4.0      Alkaline Phosphatase 59      Total Protein 7.2      AST 13      Bilirubin, Total 0.2      ALT 13     CBC WITH AUTO DIFFERENTIAL - Abnormal    WBC 8.9      nRBC 0.0      RBC 4.37      Hemoglobin 12.2      Hematocrit 34.9 (*)     MCV 80      MCH 27.9      MCHC 35.0      RDW 13.5      Platelets 215      Neutrophils % 65.4      Immature Granulocytes %, Automated 0.2      Lymphocytes % 26.8      Monocytes % 6.5      Eosinophils % 0.9      Basophils % 0.2      Neutrophils Absolute 5.79      Immature Granulocytes Absolute, Automated 0.02      Lymphocytes Absolute 2.38      Monocytes Absolute 0.58      Eosinophils Absolute 0.08      Basophils Absolute 0.02     MICROSCOPIC ONLY, URINE - Abnormal    WBC, Urine 1-5      RBC, Urine 1-2      Squamous Epithelial  Cells, Urine 1-9 (SPARSE)      Bacteria, Urine 1+ (*)     Mucus, Urine FEW     POCT PREGNANCY, URINE - Abnormal    Preg Test, Ur Positive (*)    GROUP A STREPTOCOCCUS, PCR - Normal    Group A Strep PCR Not Detected     SARS-COV-2 AND INFLUENZA A/B PCR - Normal    Flu A Result Not Detected      Flu B Result Not Detected      Coronavirus 2019, PCR Not Detected      Narrative:     This assay has received FDA Emergency Use Authorization (EUA) and  is only authorized for the duration of time that circumstances exist to justify the authorization of the emergency use of in vitro diagnostic tests for the detection of SARS-CoV-2 virus and/or diagnosis of COVID-19 infection under section 564(b)(1) of the Act, 21 U.S.C. 360bbb-3(b)(1). Testing for SARS-CoV-2 is only recommended for patients who meet current clinical and/or epidemiological criteria as defined by federal, state, or local public health directives. This assay is an in vitro diagnostic nucleic acid amplification test for the qualitative detection of SARS-CoV-2, Influenza A, and Influenza B from nasopharyngeal specimens and has been validated for use at [facility]. Negative results do not preclude COVID-19 infections or Influenza A/B infections, and should not be used as the sole basis for diagnosis, treatment, or other management decisions. If Influenza A/B and RSV PCR results are negative, testing for Parainfluenza virus, Adenovirus and Metapneumovirus is routinely performed for [facility] pediatric oncology and intensive care inpatients, and is available on other patients by placing an add-on request.    RSV PCR - Normal    RSV PCR Not Detected      Narrative:     This assay is an FDA-cleared, in vitro diagnostic nucleic acid amplification test for the detection of RSV from nasopharyngeal specimens, and has been validated for use at [facility]. Negative results do not preclude RSV infections, and should not be used as the  sole basis for diagnosis, treatment, or other management decisions. If Influenza A/B and RSV PCR results are negative, testing for Parainfluenza virus, Adenovirus and Metapneumovirus is routinely performed for pediatric oncology and intensive care inpatients at [facility], and is available on other patients by placing an add-on request.       URINE CULTURE   URINALYSIS WITH REFLEX CULTURE AND MICROSCOPIC    Narrative:     The following orders were created for panel order Urinalysis with Reflex Culture and Microscopic.  Procedure                               Abnormality         Status                     ---------                               -----------         ------                     Urinalysis with Reflex C...[[id number]]  Abnormal            Final result               Extra Urine Gray Tube[[id number]]                            In process                   Please view results for these tests on the individual orders.   EXTRA URINE GRAY TUBE   TYPE AND SCREEN       Point of Care Ultrasound    (Results Pending)   US pelvis transvaginal    (Results Pending)         ED Course & MDM     Medical Decision Making  This is a healthy 23-year-old female who presents to the ED with flulike symptoms for the past 1 week as well as very mild suprapubic abdominal pain.  On examination she was overall well-appearing.  She did have mild tenderness palpation to her suprapubic region.  No rebound or guarding.  Lungs clear to auscultation.  Posterior oropharynx grossly unremarkable to any evidence of peritonsillar abscess or retropharyngeal abscess.  Viral swabs obtained as well as strep swab and urinalysis.  Urine pregnancy test ordered.  Tylenol ordered for her pain.  Pregnancy test was found to be positive.  Bedside ultrasound attempted, however I was unable to visualize IUP.  Transvaginal ultrasound ordered as well as laboratory studies.  Viral swabs negative.  CBC and CMP both grossly unremarkable.  Beta quant was found be 139.   Patient signed out to oncoming team pending ultrasound of her pelvis results and reevaluation.    Amount and/or Complexity of Data Reviewed  Labs: ordered.  Radiology: ordered.         ED Course as of 24 Preg Test, Ur(!): Positive  Patient's pregnancy test was found to be positive.  I did discuss this result with the patient in private.  She did not know she was pregnant.  She states that her first day of her last menstrual cycle was .  She states that she is sexually active with 1 partner and the last time they were sexually active she used Plan B 5 hours later.  She is not on any type of birth control and denies condom use.  She states that she has been pregnant once previously in 2023 where she had an elective .  Bedside ultrasound attempted, however I was unable to see a IUP so transvaginal ultrasound ordered as well as laboratory studies. [AW]      ED Course User Index  [AW] Faby Barnes PA-C       Procedures    PRIYANKA Bowser PA-C Abigail E Wilch, PA-C  24

## 2024-02-28 ENCOUNTER — HOSPITAL ENCOUNTER (EMERGENCY)
Facility: HOSPITAL | Age: 24
Discharge: HOME | End: 2024-02-29
Attending: EMERGENCY MEDICINE
Payer: COMMERCIAL

## 2024-02-28 VITALS
SYSTOLIC BLOOD PRESSURE: 113 MMHG | TEMPERATURE: 97.9 F | HEIGHT: 65 IN | RESPIRATION RATE: 18 BRPM | HEART RATE: 76 BPM | DIASTOLIC BLOOD PRESSURE: 71 MMHG | WEIGHT: 160 LBS | BODY MASS INDEX: 26.66 KG/M2 | OXYGEN SATURATION: 100 %

## 2024-02-28 DIAGNOSIS — R10.84 GENERALIZED ABDOMINAL PAIN: Primary | ICD-10-CM

## 2024-02-28 DIAGNOSIS — O36.80X0 PREGNANCY OF UNKNOWN ANATOMIC LOCATION (HHS-HCC): ICD-10-CM

## 2024-02-28 LAB
BASOPHILS # BLD AUTO: 0.03 X10*3/UL (ref 0–0.1)
BASOPHILS NFR BLD AUTO: 0.4 %
EOSINOPHIL # BLD AUTO: 0.04 X10*3/UL (ref 0–0.7)
EOSINOPHIL NFR BLD AUTO: 0.5 %
ERYTHROCYTE [DISTWIDTH] IN BLOOD BY AUTOMATED COUNT: 13.8 % (ref 11.5–14.5)
HCT VFR BLD AUTO: 38 % (ref 36–46)
HGB BLD-MCNC: 13.1 G/DL (ref 12–16)
IMM GRANULOCYTES # BLD AUTO: 0.02 X10*3/UL (ref 0–0.7)
IMM GRANULOCYTES NFR BLD AUTO: 0.2 % (ref 0–0.9)
LYMPHOCYTES # BLD AUTO: 2.24 X10*3/UL (ref 1.2–4.8)
LYMPHOCYTES NFR BLD AUTO: 26.2 %
MCH RBC QN AUTO: 28.2 PG (ref 26–34)
MCHC RBC AUTO-ENTMCNC: 34.5 G/DL (ref 32–36)
MCV RBC AUTO: 82 FL (ref 80–100)
MONOCYTES # BLD AUTO: 0.54 X10*3/UL (ref 0.1–1)
MONOCYTES NFR BLD AUTO: 6.3 %
NEUTROPHILS # BLD AUTO: 5.68 X10*3/UL (ref 1.2–7.7)
NEUTROPHILS NFR BLD AUTO: 66.4 %
NRBC BLD-RTO: 0 /100 WBCS (ref 0–0)
PLATELET # BLD AUTO: 236 X10*3/UL (ref 150–450)
RBC # BLD AUTO: 4.65 X10*6/UL (ref 4–5.2)
WBC # BLD AUTO: 8.6 X10*3/UL (ref 4.4–11.3)

## 2024-02-28 PROCEDURE — 99285 EMERGENCY DEPT VISIT HI MDM: CPT | Performed by: EMERGENCY MEDICINE

## 2024-02-28 PROCEDURE — 96374 THER/PROPH/DIAG INJ IV PUSH: CPT

## 2024-02-28 PROCEDURE — 2500000005 HC RX 250 GENERAL PHARMACY W/O HCPCS

## 2024-02-28 PROCEDURE — 80053 COMPREHEN METABOLIC PANEL: CPT

## 2024-02-28 PROCEDURE — 84100 ASSAY OF PHOSPHORUS: CPT

## 2024-02-28 PROCEDURE — 86901 BLOOD TYPING SEROLOGIC RH(D): CPT

## 2024-02-28 PROCEDURE — 96361 HYDRATE IV INFUSION ADD-ON: CPT

## 2024-02-28 PROCEDURE — 36415 COLL VENOUS BLD VENIPUNCTURE: CPT

## 2024-02-28 PROCEDURE — 2500000004 HC RX 250 GENERAL PHARMACY W/ HCPCS (ALT 636 FOR OP/ED)

## 2024-02-28 PROCEDURE — 87636 SARSCOV2 & INF A&B AMP PRB: CPT

## 2024-02-28 PROCEDURE — 83690 ASSAY OF LIPASE: CPT

## 2024-02-28 PROCEDURE — 84702 CHORIONIC GONADOTROPIN TEST: CPT

## 2024-02-28 PROCEDURE — 83735 ASSAY OF MAGNESIUM: CPT

## 2024-02-28 PROCEDURE — 85025 COMPLETE CBC W/AUTO DIFF WBC: CPT

## 2024-02-28 PROCEDURE — 99284 EMERGENCY DEPT VISIT MOD MDM: CPT | Mod: 25

## 2024-02-28 RX ORDER — ONDANSETRON HYDROCHLORIDE 2 MG/ML
4 INJECTION, SOLUTION INTRAVENOUS ONCE
Status: COMPLETED | OUTPATIENT
Start: 2024-02-28 | End: 2024-02-28

## 2024-02-28 RX ORDER — LIDOCAINE 560 MG/1
1 PATCH PERCUTANEOUS; TOPICAL; TRANSDERMAL ONCE
Status: DISCONTINUED | OUTPATIENT
Start: 2024-02-28 | End: 2024-02-29 | Stop reason: HOSPADM

## 2024-02-28 RX ORDER — LIDOCAINE 560 MG/1
1 PATCH PERCUTANEOUS; TOPICAL; TRANSDERMAL DAILY
Status: DISCONTINUED | OUTPATIENT
Start: 2024-02-29 | End: 2024-02-28

## 2024-02-28 RX ADMIN — LIDOCAINE 1 PATCH: 4 PATCH TOPICAL at 23:50

## 2024-02-28 RX ADMIN — ONDANSETRON 4 MG: 2 INJECTION INTRAMUSCULAR; INTRAVENOUS at 23:50

## 2024-02-28 RX ADMIN — SODIUM CHLORIDE, POTASSIUM CHLORIDE, SODIUM LACTATE AND CALCIUM CHLORIDE 1000 ML: 600; 310; 30; 20 INJECTION, SOLUTION INTRAVENOUS at 23:50

## 2024-02-28 ASSESSMENT — PAIN - FUNCTIONAL ASSESSMENT: PAIN_FUNCTIONAL_ASSESSMENT: 0-10

## 2024-02-28 ASSESSMENT — COLUMBIA-SUICIDE SEVERITY RATING SCALE - C-SSRS
6. HAVE YOU EVER DONE ANYTHING, STARTED TO DO ANYTHING, OR PREPARED TO DO ANYTHING TO END YOUR LIFE?: NO
2. HAVE YOU ACTUALLY HAD ANY THOUGHTS OF KILLING YOURSELF?: NO
1. IN THE PAST MONTH, HAVE YOU WISHED YOU WERE DEAD OR WISHED YOU COULD GO TO SLEEP AND NOT WAKE UP?: NO

## 2024-02-28 ASSESSMENT — PAIN SCALES - GENERAL: PAINLEVEL_OUTOF10: 8

## 2024-02-29 ENCOUNTER — CLINICAL SUPPORT (OUTPATIENT)
Dept: EMERGENCY MEDICINE | Facility: HOSPITAL | Age: 24
End: 2024-02-29
Payer: COMMERCIAL

## 2024-02-29 ENCOUNTER — APPOINTMENT (OUTPATIENT)
Dept: RADIOLOGY | Facility: HOSPITAL | Age: 24
End: 2024-02-29
Payer: COMMERCIAL

## 2024-02-29 LAB
ABO GROUP (TYPE) IN BLOOD: NORMAL
ALBUMIN SERPL BCP-MCNC: 4.4 G/DL (ref 3.4–5)
ALP SERPL-CCNC: 56 U/L (ref 33–110)
ALT SERPL W P-5'-P-CCNC: 10 U/L (ref 7–45)
ANION GAP SERPL CALC-SCNC: 10 MMOL/L (ref 10–20)
ANTIBODY SCREEN: NORMAL
AST SERPL W P-5'-P-CCNC: 15 U/L (ref 9–39)
ATRIAL RATE: 56 BPM
B-HCG SERPL-ACNC: 522 MIU/ML
BILIRUB SERPL-MCNC: 0.2 MG/DL (ref 0–1.2)
BUN SERPL-MCNC: 11 MG/DL (ref 6–23)
CALCIUM SERPL-MCNC: 9.3 MG/DL (ref 8.6–10.6)
CHLORIDE SERPL-SCNC: 106 MMOL/L (ref 98–107)
CO2 SERPL-SCNC: 24 MMOL/L (ref 21–32)
CREAT SERPL-MCNC: 0.59 MG/DL (ref 0.5–1.05)
EGFRCR SERPLBLD CKD-EPI 2021: >90 ML/MIN/1.73M*2
FLUAV RNA RESP QL NAA+PROBE: NOT DETECTED
FLUBV RNA RESP QL NAA+PROBE: NOT DETECTED
GLUCOSE SERPL-MCNC: 90 MG/DL (ref 74–99)
HOLD SPECIMEN: NORMAL
HOLD SPECIMEN: NORMAL
LIPASE SERPL-CCNC: 27 U/L (ref 9–82)
MAGNESIUM SERPL-MCNC: 2.14 MG/DL (ref 1.6–2.4)
P AXIS: 32 DEGREES
P OFFSET: 175 MS
P ONSET: 135 MS
PHOSPHATE SERPL-MCNC: 2.9 MG/DL (ref 2.5–4.9)
POTASSIUM SERPL-SCNC: 3.9 MMOL/L (ref 3.5–5.3)
PR INTERVAL: 178 MS
PROT SERPL-MCNC: 7.7 G/DL (ref 6.4–8.2)
Q ONSET: 224 MS
QRS COUNT: 9 BEATS
QRS DURATION: 82 MS
QT INTERVAL: 396 MS
QTC CALCULATION(BAZETT): 382 MS
QTC FREDERICIA: 387 MS
R AXIS: 39 DEGREES
RH FACTOR (ANTIGEN D): NORMAL
SARS-COV-2 RNA RESP QL NAA+PROBE: NOT DETECTED
SODIUM SERPL-SCNC: 136 MMOL/L (ref 136–145)
T AXIS: 38 DEGREES
T OFFSET: 422 MS
VENTRICULAR RATE: 56 BPM

## 2024-02-29 PROCEDURE — 93005 ELECTROCARDIOGRAM TRACING: CPT

## 2024-02-29 PROCEDURE — 76830 TRANSVAGINAL US NON-OB: CPT

## 2024-02-29 PROCEDURE — 99202 OFFICE O/P NEW SF 15 MIN: CPT

## 2024-02-29 PROCEDURE — 76817 TRANSVAGINAL US OBSTETRIC: CPT | Performed by: RADIOLOGY

## 2024-02-29 NOTE — DISCHARGE INSTRUCTIONS
You were evaluated in our emergency department today with abdominal pain and was evaluated by OB/GYN.  They have put in specific blood work for you and will follow-up with you outpatient in the next couple days.  Please go to that appointment once confirmed.  If at any point you develop severe abdominal pain, vaginal bleeding or discharge, suprapubic pain or urinary symptoms with fever or chills, please return to the emergency department and we will gladly care for you.

## 2024-02-29 NOTE — ED TRIAGE NOTES
Patient reports abdominal pain for 3 days. Was seen Monday and told to follow-up for a possible positive pregnancy so she has returned to follow-up. Pt also reports body aches.

## 2024-02-29 NOTE — CONSULTS
Gynecologic Consult    Reason for Consult: PUL, abdominal pain    Assessment/Plan      PUL, likely IUP  - Reviewed pt's clinical course, including suspicion that this is early normal pregnancy given appropriate rise in hCG over last 24 hrs. Counseled pt that given inability to identify IUP on ultrasound, ectopic pregnancy still cannot be ruled out. Discussed with pt recommendation for serial hCGs to continue to monitor development of pregnancy and rule out ectopic, with next draw on Saturday. Pt agreeable. Counseled pt on the risks of ectopic, and red flag signs to return to the ED for, including abdominal pain not improved by tylenol or severe abdominal pain; vaginal bleeding saturating a pad per hour; dizziness, lightheadedness, fevers, or chills. Pt expressed understanding.  - no current VB  - abd exam benign, recommend tylenol. Reviewed that given pt is planning termination, can also opt for ibuprofen though discussed potential risks to developing fetus if pt chooses to continue pregnancy.   - Rh positive, no indication for rhogam   - Lab requisition placed o obtain hCG on Saturday 3/2.  - Will continue to follow this pt and follow-up quants in the beta book.      Subjective   22yo  presenting to ED for abdominal pain. Pt initially presented  with diffuse flu symptoms and mild suprapubic abdominal pain, was found to have hCG of 136 with TVUS demonstrating no IUP, possible gestational sac, and L hemorrhagic cyst. Flu workup was negative.   Pt re-presented today for continued abdominal pain, diffuse throughout abdomen. Along LLQ radiating around suprapubic and toward back, worse with movement. Has tried tylenol, not helping much.     In the ED -  VSS  Hgb 13.1, hCG 522   FAST scan neg for intraabdominal fluid or abnormalities. Bedside pelvic with possible cystic structure visualized. TVUS with minimal interval change from prior ED visit, redemonstration of L-ovarian hemorrhagic cyst.     PM reviewed,  noncontributory.    Obstetrical History   OB History    Para Term  AB Living   1             SAB IAB Ectopic Multiple Live Births                  # Outcome Date GA Lbr Ezio/2nd Weight Sex Delivery Anes PTL Lv   1                 Past Medical History  History reviewed. No pertinent past medical history.     Past Surgical History   History reviewed. No pertinent surgical history.    Social History  Social History     Tobacco Use    Smoking status: Never    Smokeless tobacco: Never   Substance Use Topics    Alcohol use: Never     Substance and Sexual Activity   Drug Use Never       Allergies  Patient has no known allergies.     Medications  (Not in a hospital admission)      Objective    Last Vitals  Temp Pulse Resp BP MAP O2 Sat   36.6 °C (97.9 °F) 76 18 113/71   100 %     Physical Examination  General: no acute distress  HEENT: normocephalic, atraumatic  Heart: warm and well perfused  Lungs: no increased WOB  Abd: soft, minimally tender throughout LLQ, no rebound or guarding  Extremities: moving all extremities  Neuro: awake and conversant  Psych: appropriate mood and affect     Lab Review  Lab Results   Component Value Date    WBC 8.6 2024    HGB 13.1 2024    HCT 38.0 2024    MCV 82 2024     2024        === 24 ===    US PELVIS TRANSVAGINAL    - Impression -  No significant interval change in appearance of a cystic lesion  within the endometrium which may represent an early gestational sac.  No evident ectopic pregnancy. Again endometrial findings correspond  with a gestational age of 5 weeks and 0 days. Given up-trending beta  HCG, recommend continued evaluation with serial beta HCG and repeat  ultrasound evaluation in 10-14 days to evaluate for potential  viability. Earlier repeat evaluation is recommended if patient  develops concerning symptoms, as differential consideration continues  to include a non-visualized ectopic pregnancy.  Obstetrical  consultation should also be considered given ongoing symptoms.    I personally reviewed the images/study and I agree with the resident  findings as stated by Edie Chapman MD.    MACRO:  None.    Signed by: Edson Bautista 2/29/2024 4:21 AM  Dictation workstation:   [id number]

## 2024-02-29 NOTE — PROGRESS NOTES
Patient was handed off to me from the previous team. For full history, physical, and prior ED course, please see previous provider note prior to patient handoff. This is an addendum to the record.    Briefly, this is a 23-year-old female presenting to the emergency department in the setting of a recent pregnancy test as well as abdominal pain.  Patient is concerned she might have an ectopic pregnancy.  At time of signout, patient was pending results from ultrasound imaging to confirm gestational sac location as well as evaluation by OB/GYN.  TVUS is negative for ectopic pregnancy, gestational sac appreciated within the endometrium.  OB evaluated patient at bedside, will plan to add the patient to their beta book and coordinate outpatient follow-up in 2 days.  Patient will be contacted by them after discharge.  Patient updated with these results and is agreeable with plan.  Do not feel that further workup indicated at this time. Discussed results, diagnosis/differential, and plan with patient. Patient advised to follow up with primary physician in 2-3 days. Discussed return precautions and encouraged patient to return to the Emergency Department for any concerning symptoms or worsening condition. Patient expresses understanding and is in agreement. All questions answered. Patient discharged in stable condition.      Throughout the ED stay, the patient was monitored and re-examined for any changes in stability or symptomatology. The patient was instructed to return to the ED if any symptoms recurred, worsened, or if there was any additional concerns.    Pt seen and discussed with Dr. Pope Myriam Betancourt DO.  Emergency Medicine PGY-2

## 2024-03-01 LAB
ABO GROUP (TYPE) IN BLOOD: NORMAL
RH FACTOR (ANTIGEN D): NORMAL

## 2024-03-03 NOTE — ED PROVIDER NOTES
HPI:      Limitations to History: none  Additional History Obtained from: n/a  External records reviewed:prior ED notes    Chief Complaint   Patient presents with    Abdominal Pain          Stephane Buckley is a 23 y.o. female with past medical history of elective  presenting to the ED with worsening lower abdominal pain.  She was seen 2 days ago here in the emergency department for lower abdominal pain, flulike symptoms, and was found to have a positive pregnancy test.  Beta-hCG at that time was 139.  Her transvaginal ultrasound did not show any intrauterine pregnancy.  She was discharged home with OB/GYN follow-up.  She return to the emergency department today due to worsening lower abdominal pain, denies any vaginal bleeding or discharge.  Patient notes she is concerned, because this is not a desired pregnancy.  She denies any fevers at home, or any other concerns for STDs.  Does note she still has continued upper respiratory symptoms, but they have not gotten any worse.      History reviewed. No pertinent past medical history.  History reviewed. No pertinent surgical history.  Social History     Tobacco Use    Smoking status: Never    Smokeless tobacco: Never   Substance Use Topics    Alcohol use: Never    Drug use: Never     No Known Allergies    --------------------------------------------------------------------------------------------------------------------------------------    VS: As documented in the triage note and EMR flowsheet from this visit were reviewed.  Temp 36.6 °C (97.9 °F) HR 76 /71 RR 18 Sat 100 % on      Physical Exam:  GEN:  no acute distress, appears comfortable. Conversational and appropriate.    HEENT: Normocephalic, atraumatic. Conjunctiva pink with no redness or exudates. Hearing grossly intact. Moist mucous membranes.  CARDIO: Normal rate and regular rhythm. Normal S1, S2  without murmurs, rubs, or gallops.   PULM: Clear to auscultation bilaterally. No rales, rhonchi,  or wheezes. No accessory muscle use or stridor. Speaking in full sentences.  GI: Soft, tenderness to palpation of the suprapubic abdomen, non-distended. No rebound tenderness or guarding.   SKIN: Warm and dry, no rashes, lesions, petechiae, or purpura.  MSK: ROM intact in all 4 extremities without contractures or pain. No peripheral edema, contusions, or wounds.    NEURO: A&Ox3, No focal findings identified. No confusion or gross mental status changes.  PSYCH: Appropriate mood and behavior, converses and responds appropriately during exam.        ---------------------------------------------------------------------------------------------------------------------------------------    Given in the ED:   Medications   lactated Ringer's bolus 1,000 mL (0 mL intravenous Stopped 2/29/24 0050)   ondansetron (Zofran) injection 4 mg (4 mg intravenous Given 2/28/24 2350)        Work up: All labs and imaging were independently reviewed by me.    Labs Reviewed   HUMAN CHORIONIC GONADOTROPIN, SERUM QUANTITATIVE - Abnormal       Result Value    HCG, Beta-Quantitative 522 (*)     Narrative:     Total HCG measurement is performed using the Siemens [employer] immunoassay which detects intact HCG and free beta HCG subunit.  This test is not indicated for use as a tumor marker.  HCG testing is performed using a different test methodology at [facility] than other [facility]. Direct result comparison  should only be made within the same method.          COMPREHENSIVE METABOLIC PANEL - Normal    Glucose 90      Sodium 136      Potassium 3.9      Chloride 106      Bicarbonate 24      Anion Gap 10      Urea Nitrogen 11      Creatinine 0.59      eGFR >90      Calcium 9.3      Albumin 4.4      Alkaline Phosphatase 56      Total Protein 7.7      AST 15      Bilirubin, Total 0.2      ALT 10     PHOSPHORUS - Normal    Phosphorus 2.9     MAGNESIUM - Normal    Magnesium 2.14     SARS-COV-2 PCR - Normal    Coronavirus 2019, PCR  Not Detected      Narrative:     This assay has received FDA Emergency Use Authorization (EUA) and is only authorized for the duration of time that circumstances exist to justify the authorization of the emergency use of in vitro diagnostic tests for the detection of SARS-CoV-2 virus and/or diagnosis of COVID-19 infection under section 564(b)(1) of the Act, 21 U.S.C. 360bbb-3(b)(1). This assay is an in vitro diagnostic nucleic acid amplification test for the qualitative detection of SARS-CoV-2 from nasopharyngeal specimens and has been validated for use at [facility]. Negative results do not preclude COVID-19 infections and should not be used as the sole basis for diagnosis, treatment, or other management decisions.     INFLUENZA A AND B PCR - Normal    Flu A Result Not Detected      Flu B Result Not Detected      Narrative:     This assay is an in vitro diagnostic multiplex nucleic acid amplification test for the detection and discrimination of Influenza A & B from nasopharyngeal specimens, and has been validated for use at [facility]. Negative results do not preclude Influenza A/B infections, and should not be used as the sole basis for diagnosis, treatment, or other management decisions. If Influenza A/B and RSV PCR results are negative, testing for Parainfluenza virus, Adenovirus and Metapneumovirus is routinely performed for [facility] inpatients, and is available on other patients by placing an add-on request.   LIPASE - Normal    Lipase 27      Narrative:     Venipuncture immediately after or during the administration of Metamizole may lead to falsely low results. Testing should be performed immediately prior to Metamizole dosing.   CBC WITH AUTO DIFFERENTIAL    WBC 8.6      nRBC 0.0      RBC 4.65      Hemoglobin 13.1      Hematocrit 38.0      MCV 82      MCH 28.2      MCHC 34.5      RDW 13.8      Platelets 236      Neutrophils %  66.4      Immature Granulocytes %, Automated 0.2      Lymphocytes % 26.2      Monocytes % 6.3      Eosinophils % 0.5      Basophils % 0.4      Neutrophils Absolute 5.68      Immature Granulocytes Absolute, Automated 0.02      Lymphocytes Absolute 2.24      Monocytes Absolute 0.54      Eosinophils Absolute 0.04      Basophils Absolute 0.03     TYPE AND SCREEN    ABO TYPE O      Rh TYPE POS      ANTIBODY SCREEN NEG     ABORH    ABO TYPE O      Rh TYPE POS     GREEN TOP    Extra Tube Hold for add-ons.         US pelvis transvaginal   Final Result   No significant interval change in appearance of a cystic lesion   within the endometrium which may represent an early gestational sac.   No evident ectopic pregnancy. Again endometrial findings correspond   with a gestational age of 5 weeks and 0 days. Given up-trending beta   HCG, recommend continued evaluation with serial beta HCG and repeat   ultrasound evaluation in 10-14 days to evaluate for potential   viability. Earlier repeat evaluation is recommended if patient   develops concerning symptoms, as differential consideration continues   to include a non-visualized ectopic pregnancy. Obstetrical   consultation should also be considered given ongoing symptoms.        I personally reviewed the images/study and I agree with the resident   findings as stated by Edie Chapman MD.        MACRO:   None.        Signed by: Edson Bautista 2024 4:21 AM   Dictation workstation:   [id number]          Diagnoses as of 24 2305   Generalized abdominal pain       MDM:  Briefly, this is a G0, P2 female with prior history of elective medication assisted  presenting to the ED with worsening lower abdominal pain after positive pregnancy test 2 days ago while in the ED.  Initial beta-hCG was 139, and repeat was greater than 500.  She was hemodynamically stable, therefore low suspicion for ruptured ectopic pregnancy, however ectopic pregnancy is still within the differential  given lack of visualize IUP on prior transvaginal ultrasound 2 days ago.  Unable to visualize IUP on point-of-care ultrasound at bedside.  Lab work did not show any acute findings of bleeding or infection.  Low concern for ovarian torsion given minimal pain, however will rule out with ultrasound.  Patient remained hemodynamically stable in the ED, transvaginal ultrasound was performed while in the ED, and was pending at the time of signout.  Signed out to oncoming provider, ultrasound of the lower abdomen and OB/GYN consult.      Impression:   1. Generalized abdominal pain      Plan and Disposition: Signout to oncoming provider, pending transvaginal ultrasound and further recommendations of OB/GYN.        Genevieve Otero DO  Emergency Medicine, PGY-2    Patient was seen and evaluated by the attending physician. The attending ED physician agrees with the plan. Patient and/or patient´s representative was counseled regarding labs, imaging, likely diagnosis, and plan. All questions were answered.  Disclaimer: This note was dictated by speech recognition.  Attempt at proofreading was made to minimize errors.  Errors in transcription may be present.  Please call if questions.     Genevieve Otero DO  Resident  03/02/24 1039

## 2024-03-09 DIAGNOSIS — O36.80X0 PREGNANCY OF UNKNOWN ANATOMIC LOCATION (HHS-HCC): Primary | ICD-10-CM

## 2024-03-11 ENCOUNTER — HOSPITAL ENCOUNTER (EMERGENCY)
Facility: HOSPITAL | Age: 24
Discharge: HOME | End: 2024-03-12
Attending: EMERGENCY MEDICINE
Payer: COMMERCIAL

## 2024-03-11 DIAGNOSIS — R10.9: Primary | ICD-10-CM

## 2024-03-11 DIAGNOSIS — O26.899: Primary | ICD-10-CM

## 2024-03-11 LAB — PREGNANCY TEST URINE, POC: POSITIVE

## 2024-03-11 PROCEDURE — 99284 EMERGENCY DEPT VISIT MOD MDM: CPT | Performed by: EMERGENCY MEDICINE

## 2024-03-11 PROCEDURE — 99284 EMERGENCY DEPT VISIT MOD MDM: CPT | Mod: 25

## 2024-03-11 PROCEDURE — 2500000005 HC RX 250 GENERAL PHARMACY W/O HCPCS

## 2024-03-11 PROCEDURE — 81025 URINE PREGNANCY TEST: CPT

## 2024-03-11 RX ORDER — ONDANSETRON 4 MG/1
4 TABLET, ORALLY DISINTEGRATING ORAL ONCE
Status: COMPLETED | OUTPATIENT
Start: 2024-03-11 | End: 2024-03-11

## 2024-03-11 RX ORDER — ACETAMINOPHEN 325 MG/1
650 TABLET ORAL ONCE
Status: COMPLETED | OUTPATIENT
Start: 2024-03-11 | End: 2024-03-11

## 2024-03-11 RX ADMIN — ACETAMINOPHEN 650 MG: 325 TABLET ORAL at 23:57

## 2024-03-11 RX ADMIN — ONDANSETRON 4 MG: 4 TABLET, ORALLY DISINTEGRATING ORAL at 23:57

## 2024-03-11 ASSESSMENT — PAIN DESCRIPTION - PAIN TYPE: TYPE: ACUTE PAIN

## 2024-03-11 ASSESSMENT — PAIN SCALES - GENERAL
PAINLEVEL_OUTOF10: 4
PAINLEVEL_OUTOF10: 7

## 2024-03-11 ASSESSMENT — PAIN SCALES - PAIN ASSESSMENT IN ADVANCED DEMENTIA (PAINAD)
BODYLANGUAGE: RELAXED
BREATHING: NORMAL
TOTALSCORE: 0
FACIALEXPRESSION: SMILING OR INEXPRESSIVE
CONSOLABILITY: NO NEED TO CONSOLE

## 2024-03-11 ASSESSMENT — PAIN - FUNCTIONAL ASSESSMENT
PAIN_FUNCTIONAL_ASSESSMENT: 0-10
PAIN_FUNCTIONAL_ASSESSMENT: 0-10

## 2024-03-11 ASSESSMENT — LIFESTYLE VARIABLES
HAVE YOU EVER FELT YOU SHOULD CUT DOWN ON YOUR DRINKING: NO
EVER FELT BAD OR GUILTY ABOUT YOUR DRINKING: NO
HAVE PEOPLE ANNOYED YOU BY CRITICIZING YOUR DRINKING: NO
EVER HAD A DRINK FIRST THING IN THE MORNING TO STEADY YOUR NERVES TO GET RID OF A HANGOVER: NO

## 2024-03-11 ASSESSMENT — PAIN DESCRIPTION - ORIENTATION: ORIENTATION: LEFT

## 2024-03-11 ASSESSMENT — PAIN DESCRIPTION - LOCATION
LOCATION: ABDOMEN
LOCATION: ABDOMEN

## 2024-03-12 ENCOUNTER — APPOINTMENT (OUTPATIENT)
Dept: RADIOLOGY | Facility: HOSPITAL | Age: 24
End: 2024-03-12
Payer: COMMERCIAL

## 2024-03-12 VITALS
BODY MASS INDEX: 26.66 KG/M2 | DIASTOLIC BLOOD PRESSURE: 76 MMHG | SYSTOLIC BLOOD PRESSURE: 124 MMHG | RESPIRATION RATE: 14 BRPM | OXYGEN SATURATION: 100 % | HEART RATE: 77 BPM | WEIGHT: 160 LBS | TEMPERATURE: 97.3 F | HEIGHT: 65 IN

## 2024-03-12 LAB
ALBUMIN SERPL BCP-MCNC: 4.2 G/DL (ref 3.4–5)
ALP SERPL-CCNC: 45 U/L (ref 33–110)
ALT SERPL W P-5'-P-CCNC: 10 U/L (ref 7–45)
ANION GAP SERPL CALC-SCNC: 11 MMOL/L (ref 10–20)
APPEARANCE UR: CLEAR
AST SERPL W P-5'-P-CCNC: 13 U/L (ref 9–39)
B-HCG SERPL-ACNC: ABNORMAL MIU/ML
BACTERIA #/AREA URNS AUTO: ABNORMAL /HPF
BASOPHILS # BLD MANUAL: 0.14 X10*3/UL (ref 0–0.1)
BASOPHILS NFR BLD MANUAL: 1.7 %
BILIRUB SERPL-MCNC: 0.3 MG/DL (ref 0–1.2)
BILIRUB UR STRIP.AUTO-MCNC: NEGATIVE MG/DL
BUN SERPL-MCNC: 7 MG/DL (ref 6–23)
C TRACH RRNA SPEC QL NAA+PROBE: NEGATIVE
CALCIUM SERPL-MCNC: 8.8 MG/DL (ref 8.6–10.6)
CHLORIDE SERPL-SCNC: 106 MMOL/L (ref 98–107)
CLUE CELLS SPEC QL WET PREP: NORMAL
CO2 SERPL-SCNC: 21 MMOL/L (ref 21–32)
COLOR UR: ABNORMAL
CREAT SERPL-MCNC: 0.62 MG/DL (ref 0.5–1.05)
EGFRCR SERPLBLD CKD-EPI 2021: >90 ML/MIN/1.73M*2
EOSINOPHIL # BLD MANUAL: 0 X10*3/UL (ref 0–0.7)
EOSINOPHIL NFR BLD MANUAL: 0 %
ERYTHROCYTE [DISTWIDTH] IN BLOOD BY AUTOMATED COUNT: 13.2 % (ref 11.5–14.5)
GLUCOSE SERPL-MCNC: 89 MG/DL (ref 74–99)
GLUCOSE UR STRIP.AUTO-MCNC: NORMAL MG/DL
HCT VFR BLD AUTO: 36.8 % (ref 36–46)
HGB BLD-MCNC: 12.2 G/DL (ref 12–16)
HOLD SPECIMEN: NORMAL
IMM GRANULOCYTES # BLD AUTO: 0.04 X10*3/UL (ref 0–0.7)
IMM GRANULOCYTES NFR BLD AUTO: 0.5 % (ref 0–0.9)
KETONES UR STRIP.AUTO-MCNC: NEGATIVE MG/DL
LEUKOCYTE ESTERASE UR QL STRIP.AUTO: NEGATIVE
LIPASE SERPL-CCNC: 28 U/L (ref 9–82)
LYMPHOCYTES # BLD MANUAL: 1.54 X10*3/UL (ref 1.2–4.8)
LYMPHOCYTES NFR BLD MANUAL: 18.1 %
MCH RBC QN AUTO: 27.4 PG (ref 26–34)
MCHC RBC AUTO-ENTMCNC: 33.2 G/DL (ref 32–36)
MCV RBC AUTO: 83 FL (ref 80–100)
MONOCYTES # BLD MANUAL: 0.59 X10*3/UL (ref 0.1–1)
MONOCYTES NFR BLD MANUAL: 6.9 %
N GONORRHOEA DNA SPEC QL PROBE+SIG AMP: NEGATIVE
NEUTS SEG # BLD MANUAL: 5.93 X10*3/UL (ref 1.2–7)
NEUTS SEG NFR BLD MANUAL: 69.8 %
NITRITE UR QL STRIP.AUTO: NEGATIVE
NRBC BLD-RTO: 0 /100 WBCS (ref 0–0)
PH UR STRIP.AUTO: 5.5 [PH]
PLASMA CELLS # BLD MANUAL: 0.08 X10*3/UL
PLASMA CELLS NFR BLD MANUAL: 0.9 %
PLATELET # BLD AUTO: 220 X10*3/UL (ref 150–450)
POTASSIUM SERPL-SCNC: 3.7 MMOL/L (ref 3.5–5.3)
PROT SERPL-MCNC: 7.3 G/DL (ref 6.4–8.2)
PROT UR STRIP.AUTO-MCNC: NEGATIVE MG/DL
RBC # BLD AUTO: 4.46 X10*6/UL (ref 4–5.2)
RBC # UR STRIP.AUTO: ABNORMAL /UL
RBC #/AREA URNS AUTO: ABNORMAL /HPF
RBC MORPH BLD: ABNORMAL
SODIUM SERPL-SCNC: 134 MMOL/L (ref 136–145)
SP GR UR STRIP.AUTO: 1.01
SQUAMOUS #/AREA URNS AUTO: ABNORMAL /HPF
T VAGINALIS SPEC QL WET PREP: NORMAL
TOTAL CELLS COUNTED BLD: 116
UROBILINOGEN UR STRIP.AUTO-MCNC: NORMAL MG/DL
VARIANT LYMPHS # BLD MANUAL: 0.22 X10*3/UL (ref 0–0.5)
VARIANT LYMPHS NFR BLD: 2.6 %
WBC # BLD AUTO: 8.5 X10*3/UL (ref 4.4–11.3)
WBC #/AREA URNS AUTO: ABNORMAL /HPF
WBC VAG QL WET PREP: NORMAL
YEAST VAG QL WET PREP: NORMAL

## 2024-03-12 PROCEDURE — 80053 COMPREHEN METABOLIC PANEL: CPT

## 2024-03-12 PROCEDURE — 87210 SMEAR WET MOUNT SALINE/INK: CPT

## 2024-03-12 PROCEDURE — 85007 BL SMEAR W/DIFF WBC COUNT: CPT

## 2024-03-12 PROCEDURE — 76815 OB US LIMITED FETUS(S): CPT | Performed by: RADIOLOGY

## 2024-03-12 PROCEDURE — 36415 COLL VENOUS BLD VENIPUNCTURE: CPT

## 2024-03-12 PROCEDURE — 83690 ASSAY OF LIPASE: CPT

## 2024-03-12 PROCEDURE — 85027 COMPLETE CBC AUTOMATED: CPT

## 2024-03-12 PROCEDURE — 84702 CHORIONIC GONADOTROPIN TEST: CPT

## 2024-03-12 PROCEDURE — 87800 DETECT AGNT MULT DNA DIREC: CPT

## 2024-03-12 PROCEDURE — 76817 TRANSVAGINAL US OBSTETRIC: CPT | Performed by: RADIOLOGY

## 2024-03-12 PROCEDURE — 81001 URINALYSIS AUTO W/SCOPE: CPT

## 2024-03-12 PROCEDURE — 76817 TRANSVAGINAL US OBSTETRIC: CPT

## 2024-03-12 NOTE — ED PROVIDER NOTES
HPI   Chief Complaint   Patient presents with    Pregnancy Problem       HPI    Patient is a 6-week pregnant  23-year-old woman with past medical history of an  that presents emergency room for abdominal pain.  Patient states that she has been having left lower abdominal pain and left flank pain that began about 10 days ago.  The patient's third visit to the emergency room for very similar abdominal pain.  On 2024 patient was found to be pregnant, bedside ultrasound was unable to visualize a IUP, transvaginal ultrasound showed a cystic endometrial lesion that may be an early intrauterine pregnancy but no yolk sac or fetal pole was visualized.  Beta-hCG was 139 at that time.  As second visit on 2024 patient presented for similar abdominal pain, hCG at that time was 522, transvaginal ultrasound showed no evidence of ectopic pregnancy and similar endometrial findings previous transvaginal ultrasound.  Patient states that at this visit on  she had some subjective fevers, body aches, chills.  Patient is currently complaining of increased urination, foul odor.  Patient denies, dysuria, hematuria.                    [place] Coma Scale Score: 15                     Patient History   History reviewed. No pertinent past medical history.  History reviewed. No pertinent surgical history.  No family history on file.  Social History     Tobacco Use    Smoking status: Never    Smokeless tobacco: Never   Substance Use Topics    Alcohol use: Never    Drug use: Never       Physical Exam   ED Triage Vitals [248]   Temperature Heart Rate Respirations BP   36.3 °C (97.3 °F) 74 18 110/72      Pulse Ox Temp Source Heart Rate Source Patient Position   100 % Tympanic -- --      BP Location FiO2 (%)     -- 21 %       Physical Exam  Exam conducted with a chaperone present.   Constitutional:       Appearance: Normal appearance. She is normal weight.   HENT:      Head: Normocephalic and atraumatic.      Right  Ear: External ear normal.      Left Ear: External ear normal.      Nose: Nose normal.      Mouth/Throat:      Mouth: Mucous membranes are moist.      Pharynx: Oropharynx is clear.   Eyes:      Conjunctiva/sclera: Conjunctivae normal.      Pupils: Pupils are equal, round, and reactive to light.   Cardiovascular:      Rate and Rhythm: Normal rate and regular rhythm.   Pulmonary:      Effort: Pulmonary effort is normal.   Abdominal:      General: Abdomen is flat. There is no distension.      Palpations: Abdomen is soft.      Tenderness: There is abdominal tenderness. There is no guarding or rebound.      Comments: LLQ abdominal exam, Left flank pain, Left CVA tenderness   Genitourinary:     General: Normal vulva.      Labia:         Right: No rash, tenderness or lesion.         Left: No rash, tenderness or lesion.       Vagina: No vaginal discharge or erythema.      Cervix: Cervical motion tenderness, discharge and cervical bleeding present. No friability, lesion or erythema.      Uterus: Normal.       Adnexa: Right adnexa normal and left adnexa normal.        Right: No tenderness or fullness.          Left: No tenderness or fullness.        Comments: Some scant bloody discharge   Musculoskeletal:         General: Normal range of motion.      Cervical back: Normal range of motion and neck supple.   Skin:     General: Skin is warm.      Capillary Refill: Capillary refill takes less than 2 seconds.   Neurological:      General: No focal deficit present.      Mental Status: She is alert and oriented to person, place, and time. Mental status is at baseline.         ED Course & MDM   ED Course as of 24 0319   e Mar 12, 2024   0210 HCG, Beta-Quantitative(!): 30,503 [YG]      ED Course User Index  [YG] Shawnee Noel MD       Medical Decision Making  Patient is a 6-week pregnant  23-year-old woman with past medical history of an  that presents emergency room for abdominal pain.  Patient states that she has  been having left lower abdominal pain and left flank pain that began about 10 days ago.  On pelvic exam shows a nonfriable cervix, slight dark cervical bloody discharge, no foul order, centimeters tenderness, no bilateral adnexal tenderness or fullness.  Patient was moderately discomforted in the pelvic exam.    Differential diagnosis includes ectopic pregnancy, incomplete , missed , urinary tract infection, pyelonephritis, diverticulitis, appendicitis, pancreatitis, ovarian torsion, cervicitis.      CBC, CMP, lipase within normal limits.  UA shows 1+ blood, 1+ bacteria, no leukocytosis, no nitrates or leukocyte Estrace.  Patient is positive for urine pregnancy and has a beta-hCG of 30,503.  Transabdominal ultrasound is done at bedside and was unable to find a yolk sac or fetal pole but was able to visualize a gestational sac.  No fetal heart rate was able to be visualized.  Given that the possible IUP was not able to be visualized, we ordered a transvaginal ultrasound.  Transvaginal ultrasound results are pending along with the chlamydia and gonorrhea testing.  Wet prep negative.  Patient was given Tylenol and Zofran for the abdominal pain and nausea.  Patient is vitally stable prior to handoff to Dr. Madison Angeles.  If the transvaginal ultrasound is positive for an ectopic pregnancy, please consult OB/GYN.  Patient still should follow-up with OB/GYN given that this patient is the third time she has been present to the emergency room for this left lower quadrant abdominal pain.    Procedure  Procedures     Shawnee Noel MD  Resident  24 2100

## 2024-03-12 NOTE — ED TRIAGE NOTES
Patient is 6 weeks pregnant with left flank and lower abdominal pain, that started about 10 days ago. Patient denies any bleeding or painful urination

## 2024-03-12 NOTE — PROGRESS NOTES
Emergency Medicine Transition of Care Note.    I received Stephane Buckley in signout from Dr. Dexter.  Please see the previous ED provider note for all HPI, PE and MDM up to the time of signout at 0100 on 3/12/24. This is in addition to the primary record.    In brief Stephane Buckley is an 23 y.o. female presenting for   Chief Complaint   Patient presents with    Pregnancy Problem     At the time of signout we were awaiting: TV ultrasound results    ED Course as of 03/12/24 0523   Tue Mar 12, 2024   0210 HCG, Beta-Quantitative(!): 30,503 [YG]      ED Course User Index  [YG] Shawnee Noel MD         Diagnoses as of 03/12/24 0523   Abdominal pain complicating pregnancy     Labs Reviewed   URINALYSIS WITH REFLEX CULTURE AND MICROSCOPIC - Abnormal       Result Value    Color, Urine Light-Yellow      Appearance, Urine Clear      Specific Gravity, Urine 1.009      pH, Urine 5.5      Protein, Urine NEGATIVE      Glucose, Urine Normal      Blood, Urine 0.06 (1+) (*)     Ketones, Urine NEGATIVE      Bilirubin, Urine NEGATIVE      Urobilinogen, Urine Normal      Nitrite, Urine NEGATIVE      Leukocyte Esterase, Urine NEGATIVE     COMPREHENSIVE METABOLIC PANEL - Abnormal    Glucose 89      Sodium 134 (*)     Potassium 3.7      Chloride 106      Bicarbonate 21      Anion Gap 11      Urea Nitrogen 7      Creatinine 0.62      eGFR >90      Calcium 8.8      Albumin 4.2      Alkaline Phosphatase 45      Total Protein 7.3      AST 13      Bilirubin, Total 0.3      ALT 10     HUMAN CHORIONIC GONADOTROPIN, SERUM QUANTITATIVE - Abnormal    HCG, Beta-Quantitative 30,503 (*)     Narrative:     Total HCG measurement is performed using the Siemens [employer] immunoassay which detects intact HCG and free beta HCG subunit.  This test is not indicated for use as a tumor marker.  HCG testing is performed using a different test methodology at [facility] than other [facility]. Direct result comparison  should only  be made within the same method.          POCT PREGNANCY, URINE - Abnormal    Preg Test, Ur Positive (*)    URINALYSIS MICROSCOPIC WITH REFLEX CULTURE - Abnormal    WBC, Urine NONE      RBC, Urine 1-2      Squamous Epithelial Cells, Urine 1-9 (SPARSE)      Bacteria, Urine 1+ (*)    MANUAL DIFFERENTIAL - Abnormal    Neutrophils %, Manual 69.8      Lymphocytes %, Manual 18.1      Monocytes %, Manual 6.9      Eosinophils %, Manual 0.0      Basophils %, Manual 1.7      Atypical Lymphocytes %, Manual 2.6      Plasma Cells %, Manual 0.9      Seg Neutrophils Absolute, Manual 5.93      Lymphocytes Absolute, Manual 1.54      Monocytes Absolute, Manual 0.59      Eosinophils Absolute, Manual 0.00      Basophils Absolute, Manual 0.14 (*)     Atypical Lymphs Absolute, Manual 0.22      Plasma Cells Absolute, Manual 0.08      Total Cells Counted 116      RBC Morphology No significant RBC morphology present     CBC WITH AUTO DIFFERENTIAL - Normal    WBC 8.5      nRBC 0.0      RBC 4.46      Hemoglobin 12.2      Hematocrit 36.8      MCV 83      MCH 27.4      MCHC 33.2      RDW 13.2      Platelets 220      Immature Granulocytes %, Automated 0.5      Immature Granulocytes Absolute, Automated 0.04      Narrative:     The previously reported component Neutrophils % is no longer being reported.  The previously reported component Lymphocytes % is no longer being reported.  The previously reported component Monocytes % is no longer being reported.  The previously   reported component Eosinophils % is no longer being reported.  The previously reported component Basophils % is no longer being reported.  The previously reported component Absolute Neutrophils is no longer being reported.  The previously reported   component Absolute Lymphocytes is no longer being reported.  The previously reported component Absolute Monocytes is no longer being reported.  The previously reported component Absolute Eosinophils is no longer being reported.  The  previously reported   component Absolute Basophils is no longer being reported.   LIPASE - Normal    Lipase 28      Narrative:     Venipuncture immediately after or during the administration of Metamizole may lead to falsely low results. Testing should be performed immediately prior to Metamizole dosing.   WET PREP, GENITAL    Trichomonas None Seen      Clue Cells None Seen      Yeast None Seen      WBC 21-50     URINALYSIS WITH REFLEX CULTURE AND MICROSCOPIC    Narrative:     The following orders were created for panel order Urinalysis with Reflex Culture and Microscopic.  Procedure                               Abnormality         Status                     ---------                               -----------         ------                     Urinalysis with Reflex C...[[id number]]  Abnormal            Final result               Extra Urine Gray Tube[[id number]]                            In process                   Please view results for these tests on the individual orders.   EXTRA URINE GRAY TUBE   C. TRACHOMATIS + N. GONORRHOEAE, AMPLIFIED     US OB transvaginal   Final Result   Single live intrauterine gestation corresponding to 6 weeks, 1 days.   Follow up examination is recommended at 18-20 weeks gestation for   evaluation of fetal anatomy.        Resolving hemorrhagic right ovarian cyst/corpus luteum.        I personally reviewed the images/study with Tee Claudio MD (Radiology   Resident) and I agree with the findings as stated. This study was   interpreted at [facility],   [place], [place].        MACRO:   None        Signed by: Manju Weldon 3/12/2024 4:36 AM   Dictation workstation:   [id number]      Point of Care Ultrasound    (Results Pending)           Medical Decision Making  TV US results reviewed as above. Results conveyed to patient and . Patient does not have an ob/gyn doctor, but does not wish to keep the pregnancy. Given number for  as well as  Ob/gyn clinic. Patient advised to return to the ED for any worsening or new symptoms.     Final diagnoses:   [O26.899, R10.9] Abdominal pain complicating pregnancy           Procedure  Procedures    Edson Nicholas MD

## 2024-05-30 ENCOUNTER — HOSPITAL ENCOUNTER (EMERGENCY)
Facility: HOSPITAL | Age: 24
Discharge: HOME | End: 2024-05-30
Payer: COMMERCIAL

## 2024-05-30 ENCOUNTER — APPOINTMENT (OUTPATIENT)
Dept: RADIOLOGY | Facility: HOSPITAL | Age: 24
End: 2024-05-30
Payer: COMMERCIAL

## 2024-05-30 VITALS
BODY MASS INDEX: 26.63 KG/M2 | RESPIRATION RATE: 16 BRPM | OXYGEN SATURATION: 99 % | HEART RATE: 75 BPM | TEMPERATURE: 97.3 F | SYSTOLIC BLOOD PRESSURE: 119 MMHG | DIASTOLIC BLOOD PRESSURE: 76 MMHG | WEIGHT: 160 LBS

## 2024-05-30 DIAGNOSIS — M79.671 BILATERAL FOOT PAIN: Primary | ICD-10-CM

## 2024-05-30 DIAGNOSIS — M79.672 BILATERAL FOOT PAIN: Primary | ICD-10-CM

## 2024-05-30 LAB — PREGNANCY TEST URINE, POC: NEGATIVE

## 2024-05-30 PROCEDURE — 73630 X-RAY EXAM OF FOOT: CPT | Mod: 50

## 2024-05-30 PROCEDURE — 73630 X-RAY EXAM OF FOOT: CPT | Mod: BILATERAL PROCEDURE | Performed by: STUDENT IN AN ORGANIZED HEALTH CARE EDUCATION/TRAINING PROGRAM

## 2024-05-30 PROCEDURE — 2500000001 HC RX 250 WO HCPCS SELF ADMINISTERED DRUGS (ALT 637 FOR MEDICARE OP)

## 2024-05-30 PROCEDURE — 81025 URINE PREGNANCY TEST: CPT

## 2024-05-30 PROCEDURE — 99283 EMERGENCY DEPT VISIT LOW MDM: CPT

## 2024-05-30 PROCEDURE — 99284 EMERGENCY DEPT VISIT MOD MDM: CPT

## 2024-05-30 RX ORDER — NAPROXEN 500 MG/1
500 TABLET ORAL 2 TIMES DAILY PRN
Qty: 40 TABLET | Refills: 0 | Status: SHIPPED | OUTPATIENT
Start: 2024-05-30

## 2024-05-30 RX ORDER — NAPROXEN 500 MG/1
500 TABLET ORAL ONCE
Status: COMPLETED | OUTPATIENT
Start: 2024-05-30 | End: 2024-05-30

## 2024-05-30 RX ADMIN — NAPROXEN 500 MG: 500 TABLET ORAL at 17:01

## 2024-05-30 ASSESSMENT — PAIN DESCRIPTION - ORIENTATION
ORIENTATION: RIGHT
ORIENTATION: RIGHT;LEFT

## 2024-05-30 ASSESSMENT — PAIN SCALES - GENERAL
PAINLEVEL_OUTOF10: 4
PAINLEVEL_OUTOF10: 5 - MODERATE PAIN

## 2024-05-30 ASSESSMENT — PAIN DESCRIPTION - LOCATION: LOCATION: FOOT

## 2024-05-30 ASSESSMENT — PAIN - FUNCTIONAL ASSESSMENT: PAIN_FUNCTIONAL_ASSESSMENT: 0-10

## 2024-05-30 ASSESSMENT — PAIN DESCRIPTION - PAIN TYPE: TYPE: ACUTE PAIN

## 2024-05-30 ASSESSMENT — PAIN DESCRIPTION - DESCRIPTORS: DESCRIPTORS: ACHING;BURNING;CRAMPING

## 2024-05-30 NOTE — ED PROVIDER NOTES
HPI   Chief Complaint   Patient presents with    Foot Injury       This patient is a 23-year-old female who presents today for bilateral foot pain and swelling.  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  She states that she walks a lot all across campus.  Reports that for the past few months, she has had increased pain to the soles of her feet and mild swelling in the arch of her feet bilaterally.  Reports that she has constant pain in her bilateral feet though it worsens when she ambulates.  Denies any traumatic injury to her feet, no cardiac history, no history of DVT/PE, no chest pain or shortness of breath, no numbness or tingling to lower extremities.                          [place] Coma Scale Score: 15                     Patient History   No past medical history on file.  No past surgical history on file.  No family history on file.  Social History     Tobacco Use    Smoking status: Never    Smokeless tobacco: Never   Substance Use Topics    Alcohol use: Never    Drug use: Never       Physical Exam   ED Triage Vitals [05/30/24 1626]   Temperature Heart Rate Respirations BP   36.3 °C (97.3 °F) 75 16 119/76      Pulse Ox Temp Source Heart Rate Source Patient Position   99 % Skin Monitor Sitting      BP Location FiO2 (%)     Right arm --       Physical Exam  Vitals and nursing note reviewed.   Constitutional:       General: She is not in acute distress.     Appearance: Normal appearance. She is not ill-appearing.   HENT:      Head: Normocephalic and atraumatic.      Right Ear: External ear normal.      Left Ear: External ear normal.      Nose: Nose normal. No congestion or rhinorrhea.      Mouth/Throat:      Mouth: Mucous membranes are moist.      Pharynx: Oropharynx is clear. No oropharyngeal exudate or posterior oropharyngeal erythema.   Eyes:      Extraocular Movements: Extraocular movements intact.      Conjunctiva/sclera: Conjunctivae normal.      Pupils: Pupils are equal, round, and reactive to light.    Cardiovascular:      Rate and Rhythm: Normal rate and regular rhythm.      Heart sounds: Normal heart sounds.   Pulmonary:      Effort: No accessory muscle usage or respiratory distress.      Breath sounds: Normal breath sounds. No wheezing, rhonchi or rales.   Abdominal:      General: Abdomen is flat. Bowel sounds are normal. There is no distension.      Palpations: Abdomen is soft.      Tenderness: There is no abdominal tenderness. There is no right CVA tenderness or left CVA tenderness.   Musculoskeletal:         General: No swelling or deformity. Normal range of motion.      Cervical back: Normal range of motion and neck supple.      Right lower leg: No edema.      Left lower leg: No edema.        Feet:    Feet:      Comments: No bruising, deformity.  Capillary refill less than 2 seconds, no issues with movement of her digits, pulses strong.  No decrease sensation.  Trace swelling noted at the medial portion of her bilateral feet along the arches.  Skin:     General: Skin is warm and dry.      Capillary Refill: Capillary refill takes less than 2 seconds.   Neurological:      General: No focal deficit present.      Mental Status: She is alert and oriented to person, place, and time.      GCS: GCS eye subscore is 4. GCS verbal subscore is 5. GCS motor subscore is 6.      Cranial Nerves: Cranial nerves 2-12 are intact.      Sensory: No sensory deficit.      Motor: Motor function is intact. No weakness.   Psychiatric:         Mood and Affect: Mood and affect normal.         Speech: Speech normal.         Behavior: Behavior normal. Behavior is cooperative.         ED Course & MDM   ED Course as of 05/30/24 1941   Thu May 30, 2024   1702 Preg Test, Ur: Negative [ML]   1941 XR foot 3+ views bilateral  No acute fracture or malalignment of the bilateral feet. [ML]      ED Course User Index  [ML] Jewell Barahona PA-C         Diagnoses as of 05/30/24 1941   Bilateral foot pain       Medical Decision Making  23-year-old  female presenting today for foot pain and swelling bilaterally.  No calf swelling or edema.  No chest pain or shortness of breath to be concern for PE/DVT.  Based off of history and my physical exam, does appear to be similar in presentation to plantar fasciitis. XR images were negative. Discussed NSAID use and stretches with follow up with PCP if symptoms persist.         Procedure  Procedures     Jewell Barahona PA-C  05/30/24 1941

## 2024-09-11 ENCOUNTER — HOSPITAL ENCOUNTER (EMERGENCY)
Facility: HOSPITAL | Age: 24
Discharge: HOME | End: 2024-09-11
Payer: COMMERCIAL

## 2024-09-11 VITALS
HEIGHT: 65 IN | HEART RATE: 82 BPM | TEMPERATURE: 98.1 F | BODY MASS INDEX: 26.66 KG/M2 | DIASTOLIC BLOOD PRESSURE: 92 MMHG | WEIGHT: 160 LBS | RESPIRATION RATE: 18 BRPM | SYSTOLIC BLOOD PRESSURE: 138 MMHG | OXYGEN SATURATION: 100 %

## 2024-09-11 DIAGNOSIS — M79.10 MUSCLE PAIN: Primary | ICD-10-CM

## 2024-09-11 PROCEDURE — 99284 EMERGENCY DEPT VISIT MOD MDM: CPT | Performed by: EMERGENCY MEDICINE

## 2024-09-11 PROCEDURE — 2500000001 HC RX 250 WO HCPCS SELF ADMINISTERED DRUGS (ALT 637 FOR MEDICARE OP)

## 2024-09-11 PROCEDURE — 2500000004 HC RX 250 GENERAL PHARMACY W/ HCPCS (ALT 636 FOR OP/ED)

## 2024-09-11 PROCEDURE — 96372 THER/PROPH/DIAG INJ SC/IM: CPT

## 2024-09-11 PROCEDURE — 99283 EMERGENCY DEPT VISIT LOW MDM: CPT

## 2024-09-11 RX ORDER — KETOROLAC TROMETHAMINE 30 MG/ML
INJECTION, SOLUTION INTRAMUSCULAR; INTRAVENOUS
Status: COMPLETED
Start: 2024-09-11 | End: 2024-09-11

## 2024-09-11 RX ORDER — IBUPROFEN 800 MG/1
800 TABLET ORAL 3 TIMES DAILY
Qty: 30 TABLET | Refills: 0 | Status: SHIPPED | OUTPATIENT
Start: 2024-09-11 | End: 2024-09-21

## 2024-09-11 RX ORDER — KETOROLAC TROMETHAMINE 30 MG/ML
30 INJECTION, SOLUTION INTRAMUSCULAR; INTRAVENOUS ONCE
Status: COMPLETED | OUTPATIENT
Start: 2024-09-11 | End: 2024-09-11

## 2024-09-11 RX ORDER — CYCLOBENZAPRINE HCL 10 MG
10 TABLET ORAL 2 TIMES DAILY PRN
Qty: 20 TABLET | Refills: 0 | Status: SHIPPED | OUTPATIENT
Start: 2024-09-11 | End: 2024-09-21

## 2024-09-11 RX ORDER — CYCLOBENZAPRINE HCL 10 MG
5 TABLET ORAL ONCE
Status: COMPLETED | OUTPATIENT
Start: 2024-09-11 | End: 2024-09-11

## 2024-09-11 RX ADMIN — KETOROLAC TROMETHAMINE 30 MG: 30 INJECTION, SOLUTION INTRAMUSCULAR; INTRAVENOUS at 22:06

## 2024-09-11 RX ADMIN — CYCLOBENZAPRINE HYDROCHLORIDE 5 MG: 10 TABLET, FILM COATED ORAL at 22:11

## 2024-09-11 ASSESSMENT — PAIN DESCRIPTION - LOCATION
LOCATION_3: LEG
LOCATION: HEAD
LOCATION_2: ARM

## 2024-09-11 ASSESSMENT — PAIN SCALES - GENERAL
PAINLEVEL_OUTOF10: 8

## 2024-09-11 ASSESSMENT — PAIN DESCRIPTION - ORIENTATION
ORIENTATION_3: LEFT
ORIENTATION_2: LEFT

## 2024-09-11 ASSESSMENT — PAIN - FUNCTIONAL ASSESSMENT: PAIN_FUNCTIONAL_ASSESSMENT: 0-10

## 2024-09-11 ASSESSMENT — PAIN DESCRIPTION - DESCRIPTORS: DESCRIPTORS: ACHING;DISCOMFORT

## 2024-09-12 NOTE — ED TRIAGE NOTES
"Patient reports head, left arm, left leg pain for 5 days. Reports that she slipped on water and fell into the sink. States she took \"Kyrgyz medication that is similar to tylenol\". Patient ambulatory in triage. Denies LOC or blood thinners.  "

## 2024-09-12 NOTE — DISCHARGE INSTRUCTIONS
You have been evaluated in the Emergency Department today for neck and left arm pain. Your evaluation did not find evidence of medical conditions requiring emergent intervention at this time. We gave you an injection of Toradol and a dose of a muscle relaxer for pain.    We recommend you take 800mg motrin every 6 hours or tylenol 650mg every 6 hours as needed for pain. If needed, you can alternate these medications so that you take one medication every 3 hours. For instance, at noon take ibuprofen, then at 3pm take tylenol, then at 6pm take ibuprofen.    Please follow-up with [facility] in [place] for a primary care appointment.    Return to the Emergency Department if you experience worsening pain, numbness, tingling, change of color in your toes, or any other concerning symptoms.

## 2024-09-12 NOTE — ED PROVIDER NOTES
Emergency Department Provider Note        History of Present Illness     History provided by: Patient  Limitations to History: None  External Records Reviewed with Brief Summary:  ED note from 2024, patient was seen at [facility] after a fall, scans including CT head, C-spine negative.  X-ray left shoulder, wrist, left hip, left knee, chest were all negative.    HPI:  Stephane Buckley is a 24 y.o. female with no significant past medical history, presenting to the ED with body pain.  Patient notes she slipped on some water, fell and hit her head on the basin, on .  She denies loss of consciousness.  No blood thinner use.  Patient notes she still is having pain in the right side of her neck, and down her left arm.  She has full range of motion, however experiences mild pain with moving.  Patient notes she has tried using an Tongan equivalent to Tylenol that has been helpful.  She has not tried using a heating pad or ice pack for symptomatic management.    Physical Exam   Triage vitals:  T 36.7 °C (98.1 °F)  HR 82  BP (!) 138/92  RR 18  O2 100 % None (Room air)    General: Awake, alert, in no acute distress  Eyes: Gaze conjugate.  No scleral icterus or injection  HENT: Normo-cephalic, atraumatic. No stridor.  No cervical midline tenderness.  Positive tenderness to palpation over the right paraspinal muscles.  CV: Regular rate, regular rhythm. Radial pulses 2+ bilaterally  Resp: Breathing non-labored, speaking in full sentences.  Clear to auscultation bilaterally  GI: Soft, non-distended, non-tender. No rebound or guarding.  MSK/Extremities: No gross bony deformities. Moving all extremities  Skin: Warm. Appropriate color  Neuro: Alert. Oriented. Face symmetric. Speech is fluent.  Gross strength and sensation intact in b/l UE and LEs  Psych: Appropriate mood and affect    Medical Decision Making & ED Course   Medical Decision Makin y.o. female with no significant past medical history,  presenting to the ED with continued pain after a fall.  Vitals are stable on arrival.  On exam, patient has right cervical paraspinal tenderness and mild tenderness down her left upper extremity.  Patient has full range of motion of her C-spine and of her left upper extremity.  Strength 5 out of 5 in bilateral upper extremities.  Patient's symptoms are likely related to a muscular component.  I did do not think that she has a fracture as scans post fall at the [facility] were negative.  I do not think scans are warranted at this point as patient has not had a repeat trauma.  Patient given IM Toradol and a dose of Flexeril with good relief.  Provided patient with sports medicine referral and a prescription for Flexeril and ibuprofen.  Recommended the patient follow-up with her primary care physician as soon as she can.  Discussed return precaution, patient verbally agreed.  Patient discharged in stable condition.  ----    Differential diagnoses considered include but are not limited to: Muscular strain, fracture     Social Determinants of Health which Significantly Impact Care: None identified     EKG Independent Interpretation: EKG not obtained    Independent Result Review and Interpretation: Relevant laboratory and radiographic results were reviewed and independently interpreted by myself.  As necessary, they are commented on in the ED Course.    Chronic conditions affecting the patient's care: As documented above in [facility]    The patient was discussed with the following consultants/services: None    Care Considerations: As documented above in [facility]    ED Course:  Diagnoses as of 09/11/24 2228   Muscle pain     Disposition   As a result of the work-up, the patient was discharged home.  she was informed of her diagnosis and instructed to come back with any concerns or worsening of condition.  she and was agreeable to the plan as discussed above.  she was given the opportunity to ask questions.  All of the patient's  questions were answered.    Procedures   Procedures    This was a shared visit with an ED attending.  The patient was seen and discussed with the ED attending    Opal Pham DO  Emergency Medicine     Opal Pham DO  Resident  09/11/24 9076

## 2024-11-19 ENCOUNTER — APPOINTMENT (OUTPATIENT)
Dept: RADIOLOGY | Facility: HOSPITAL | Age: 24
End: 2024-11-19
Payer: COMMERCIAL

## 2024-11-19 ENCOUNTER — HOSPITAL ENCOUNTER (EMERGENCY)
Facility: HOSPITAL | Age: 24
Discharge: HOME | End: 2024-11-20
Payer: COMMERCIAL

## 2024-11-19 VITALS
WEIGHT: 160 LBS | DIASTOLIC BLOOD PRESSURE: 71 MMHG | BODY MASS INDEX: 26.66 KG/M2 | SYSTOLIC BLOOD PRESSURE: 123 MMHG | OXYGEN SATURATION: 97 % | HEART RATE: 69 BPM | RESPIRATION RATE: 15 BRPM | TEMPERATURE: 98.4 F | HEIGHT: 65 IN

## 2024-11-19 DIAGNOSIS — D35.02 ADENOMA OF LEFT ADRENAL GLAND: ICD-10-CM

## 2024-11-19 DIAGNOSIS — R10.2 PELVIC PAIN: Primary | ICD-10-CM

## 2024-11-19 DIAGNOSIS — N30.90 CYSTITIS: ICD-10-CM

## 2024-11-19 LAB
ALBUMIN SERPL BCP-MCNC: 4.6 G/DL (ref 3.4–5)
ALP SERPL-CCNC: 68 U/L (ref 33–110)
ALT SERPL W P-5'-P-CCNC: 15 U/L (ref 7–45)
ANION GAP SERPL CALC-SCNC: 12 MMOL/L (ref 10–20)
APPEARANCE UR: ABNORMAL
AST SERPL W P-5'-P-CCNC: 15 U/L (ref 9–39)
BASOPHILS # BLD AUTO: 0.03 X10*3/UL (ref 0–0.1)
BASOPHILS NFR BLD AUTO: 0.4 %
BILIRUB SERPL-MCNC: 0.3 MG/DL (ref 0–1.2)
BILIRUB UR STRIP.AUTO-MCNC: NEGATIVE MG/DL
BUN SERPL-MCNC: 9 MG/DL (ref 6–23)
CALCIUM SERPL-MCNC: 8.9 MG/DL (ref 8.6–10.6)
CHLORIDE SERPL-SCNC: 106 MMOL/L (ref 98–107)
CLUE CELLS SPEC QL WET PREP: NORMAL
CO2 SERPL-SCNC: 24 MMOL/L (ref 21–32)
COLOR UR: ABNORMAL
CREAT SERPL-MCNC: 0.64 MG/DL (ref 0.5–1.05)
EGFRCR SERPLBLD CKD-EPI 2021: >90 ML/MIN/1.73M*2
EOSINOPHIL # BLD AUTO: 0.06 X10*3/UL (ref 0–0.7)
EOSINOPHIL NFR BLD AUTO: 0.7 %
ERYTHROCYTE [DISTWIDTH] IN BLOOD BY AUTOMATED COUNT: 13.3 % (ref 11.5–14.5)
FLUAV RNA RESP QL NAA+PROBE: NOT DETECTED
FLUBV RNA RESP QL NAA+PROBE: NOT DETECTED
GLUCOSE SERPL-MCNC: 82 MG/DL (ref 74–99)
GLUCOSE UR STRIP.AUTO-MCNC: NORMAL MG/DL
HCT VFR BLD AUTO: 38.3 % (ref 36–46)
HGB BLD-MCNC: 12.9 G/DL (ref 12–16)
IMM GRANULOCYTES # BLD AUTO: 0.02 X10*3/UL (ref 0–0.7)
IMM GRANULOCYTES NFR BLD AUTO: 0.2 % (ref 0–0.9)
KETONES UR STRIP.AUTO-MCNC: NEGATIVE MG/DL
LEUKOCYTE ESTERASE UR QL STRIP.AUTO: ABNORMAL
LYMPHOCYTES # BLD AUTO: 2.64 X10*3/UL (ref 1.2–4.8)
LYMPHOCYTES NFR BLD AUTO: 32.6 %
MCH RBC QN AUTO: 26.9 PG (ref 26–34)
MCHC RBC AUTO-ENTMCNC: 33.7 G/DL (ref 32–36)
MCV RBC AUTO: 80 FL (ref 80–100)
MONOCYTES # BLD AUTO: 0.47 X10*3/UL (ref 0.1–1)
MONOCYTES NFR BLD AUTO: 5.8 %
MUCOUS THREADS #/AREA URNS AUTO: ABNORMAL /LPF
NEUTROPHILS # BLD AUTO: 4.88 X10*3/UL (ref 1.2–7.7)
NEUTROPHILS NFR BLD AUTO: 60.3 %
NITRITE UR QL STRIP.AUTO: NEGATIVE
NRBC BLD-RTO: 0 /100 WBCS (ref 0–0)
PH UR STRIP.AUTO: 5 [PH]
PLATELET # BLD AUTO: 238 X10*3/UL (ref 150–450)
POTASSIUM SERPL-SCNC: 4.1 MMOL/L (ref 3.5–5.3)
PREGNANCY TEST URINE, POC: NEGATIVE
PROT SERPL-MCNC: 7.5 G/DL (ref 6.4–8.2)
PROT UR STRIP.AUTO-MCNC: NEGATIVE MG/DL
RBC # BLD AUTO: 4.79 X10*6/UL (ref 4–5.2)
RBC # UR STRIP.AUTO: NEGATIVE /UL
RBC #/AREA URNS AUTO: ABNORMAL /HPF
SARS-COV-2 RNA RESP QL NAA+PROBE: NOT DETECTED
SODIUM SERPL-SCNC: 138 MMOL/L (ref 136–145)
SP GR UR STRIP.AUTO: 1.02
SQUAMOUS #/AREA URNS AUTO: ABNORMAL /HPF
T VAGINALIS SPEC QL WET PREP: NORMAL
TRICHOMONAS REFLEX COMMENT: NORMAL
UROBILINOGEN UR STRIP.AUTO-MCNC: NORMAL MG/DL
WBC # BLD AUTO: 8.1 X10*3/UL (ref 4.4–11.3)
WBC #/AREA URNS AUTO: ABNORMAL /HPF
WBC VAG QL WET PREP: NORMAL
YEAST VAG QL WET PREP: NORMAL

## 2024-11-19 PROCEDURE — 36415 COLL VENOUS BLD VENIPUNCTURE: CPT

## 2024-11-19 PROCEDURE — 85025 COMPLETE CBC W/AUTO DIFF WBC: CPT

## 2024-11-19 PROCEDURE — 87210 SMEAR WET MOUNT SALINE/INK: CPT | Mod: 59

## 2024-11-19 PROCEDURE — 74177 CT ABD & PELVIS W/CONTRAST: CPT | Mod: FOREIGN READ | Performed by: RADIOLOGY

## 2024-11-19 PROCEDURE — 99285 EMERGENCY DEPT VISIT HI MDM: CPT | Mod: 25

## 2024-11-19 PROCEDURE — 99285 EMERGENCY DEPT VISIT HI MDM: CPT

## 2024-11-19 PROCEDURE — 2500000004 HC RX 250 GENERAL PHARMACY W/ HCPCS (ALT 636 FOR OP/ED)

## 2024-11-19 PROCEDURE — 96374 THER/PROPH/DIAG INJ IV PUSH: CPT | Mod: 59

## 2024-11-19 PROCEDURE — 2550000001 HC RX 255 CONTRASTS

## 2024-11-19 PROCEDURE — 74177 CT ABD & PELVIS W/CONTRAST: CPT

## 2024-11-19 PROCEDURE — 87636 SARSCOV2 & INF A&B AMP PRB: CPT

## 2024-11-19 PROCEDURE — 87661 TRICHOMONAS VAGINALIS AMPLIF: CPT

## 2024-11-19 PROCEDURE — 87086 URINE CULTURE/COLONY COUNT: CPT

## 2024-11-19 PROCEDURE — 87591 N.GONORRHOEAE DNA AMP PROB: CPT

## 2024-11-19 PROCEDURE — 81001 URINALYSIS AUTO W/SCOPE: CPT

## 2024-11-19 PROCEDURE — 87491 CHLMYD TRACH DNA AMP PROBE: CPT

## 2024-11-19 PROCEDURE — 80053 COMPREHEN METABOLIC PANEL: CPT

## 2024-11-19 PROCEDURE — 81025 URINE PREGNANCY TEST: CPT

## 2024-11-19 RX ORDER — KETOROLAC TROMETHAMINE 15 MG/ML
15 INJECTION, SOLUTION INTRAMUSCULAR; INTRAVENOUS ONCE
Status: COMPLETED | OUTPATIENT
Start: 2024-11-19 | End: 2024-11-19

## 2024-11-19 RX ADMIN — IOHEXOL 80 ML: 350 INJECTION, SOLUTION INTRAVENOUS at 22:50

## 2024-11-19 RX ADMIN — KETOROLAC TROMETHAMINE 15 MG: 15 INJECTION, SOLUTION INTRAMUSCULAR; INTRAVENOUS at 22:20

## 2024-11-19 ASSESSMENT — PAIN - FUNCTIONAL ASSESSMENT: PAIN_FUNCTIONAL_ASSESSMENT: UNABLE TO SELF-REPORT

## 2024-11-19 ASSESSMENT — COLUMBIA-SUICIDE SEVERITY RATING SCALE - C-SSRS
1. IN THE PAST MONTH, HAVE YOU WISHED YOU WERE DEAD OR WISHED YOU COULD GO TO SLEEP AND NOT WAKE UP?: NO
6. HAVE YOU EVER DONE ANYTHING, STARTED TO DO ANYTHING, OR PREPARED TO DO ANYTHING TO END YOUR LIFE?: NO
2. HAVE YOU ACTUALLY HAD ANY THOUGHTS OF KILLING YOURSELF?: NO

## 2024-11-19 NOTE — ED TRIAGE NOTES
Patient presents to ED with c/o fevers on and off x 1 week. Pt does not have current fever in triage. Patient additionally reports LLQ abdominal pain. Patient states she had an  in February of this year. Patient reports menstrual cycle not being on time.

## 2024-11-20 ENCOUNTER — APPOINTMENT (OUTPATIENT)
Dept: RADIOLOGY | Facility: HOSPITAL | Age: 24
End: 2024-11-20
Payer: COMMERCIAL

## 2024-11-20 LAB
BACTERIA UR CULT: NORMAL
C TRACH RRNA SPEC QL NAA+PROBE: NEGATIVE
HOLD SPECIMEN: NORMAL
N GONORRHOEA DNA SPEC QL PROBE+SIG AMP: NEGATIVE
T VAGINALIS RRNA SPEC QL NAA+PROBE: NEGATIVE

## 2024-11-20 PROCEDURE — 76856 US EXAM PELVIC COMPLETE: CPT

## 2024-11-20 PROCEDURE — 76856 US EXAM PELVIC COMPLETE: CPT | Performed by: RADIOLOGY

## 2024-11-20 PROCEDURE — 76830 TRANSVAGINAL US NON-OB: CPT | Performed by: RADIOLOGY

## 2024-11-20 RX ORDER — NAPROXEN 500 MG/1
500 TABLET ORAL 2 TIMES DAILY PRN
Qty: 30 TABLET | Refills: 0 | Status: SHIPPED | OUTPATIENT
Start: 2024-11-20

## 2024-11-20 RX ORDER — NITROFURANTOIN 25; 75 MG/1; MG/1
100 CAPSULE ORAL 2 TIMES DAILY
Qty: 10 CAPSULE | Refills: 0 | Status: SHIPPED | OUTPATIENT
Start: 2024-11-20 | End: 2024-12-25

## 2024-11-20 ASSESSMENT — LIFESTYLE VARIABLES
EVER FELT BAD OR GUILTY ABOUT YOUR DRINKING: NO
TOTAL SCORE: 0
EVER HAD A DRINK FIRST THING IN THE MORNING TO STEADY YOUR NERVES TO GET RID OF A HANGOVER: NO
HAVE YOU EVER FELT YOU SHOULD CUT DOWN ON YOUR DRINKING: NO
HAVE PEOPLE ANNOYED YOU BY CRITICIZING YOUR DRINKING: NO

## 2024-11-20 NOTE — DISCHARGE INSTRUCTIONS
Please follow-up with OB/GYN for pelvic pain and primary care for your mass on your adrenal gland.  Can call [phone] to help make appointments for both  Can take naproxen as needed for pain  Take Macrobid for possible UTI.  Will send off for culture to confirm

## 2024-11-20 NOTE — ED PROVIDER NOTES
HPI   Chief Complaint   Patient presents with    Abdominal Pain       Patient is a 24-year-old female with with previous medical  in March of this year, no other medical history, presenting today for abdominal pain.  For the past week, has had subjective chills and fevers.  Afebrile on arrival.  Endorsing left upper quadrant abdominal pain without radiation and right sided flank pain without radiation.  Also reports lower abdominal sharp pain and cramping.  Denies any vaginal discharge.  Reports increased urinary frequency without dysuria or hematuria.  States that ever since her medical , she has had inconsistent cycles and missed her last menstrual cycle.  Has not been sexually active for the past 2 months.  Does report a nonproductive cough without shortness of breath or chest pain.  Denies any other URI-like symptoms.  Denies any constipation or diarrhea.  Of note, also reports that since having the medical , she has had vaginal pain with insertion, has not seen any OB/GYN since.              Patient History   No past medical history on file.  No past surgical history on file.  No family history on file.  Social History     Tobacco Use    Smoking status: Never    Smokeless tobacco: Never   Substance Use Topics    Alcohol use: Never    Drug use: Never       Physical Exam   ED Triage Vitals [24 1830]   Temperature Heart Rate Respirations BP   36.9 °C (98.4 °F) 69 15 123/71      Pulse Ox Temp src Heart Rate Source Patient Position   97 % -- Monitor Sitting      BP Location FiO2 (%)     Left arm --       Physical Exam  Vitals and nursing note reviewed. Exam conducted with a chaperone present.   Constitutional:       General: She is not in acute distress.     Appearance: Normal appearance. She is not ill-appearing.   HENT:      Head: Normocephalic and atraumatic.      Right Ear: External ear normal.      Left Ear: External ear normal.      Nose: Nose normal. No congestion or rhinorrhea.       Mouth/Throat:      Mouth: Mucous membranes are moist.      Pharynx: Oropharynx is clear. No oropharyngeal exudate or posterior oropharyngeal erythema.   Eyes:      Extraocular Movements: Extraocular movements intact.      Conjunctiva/sclera: Conjunctivae normal.      Pupils: Pupils are equal, round, and reactive to light.   Cardiovascular:      Rate and Rhythm: Normal rate and regular rhythm.      Heart sounds: Normal heart sounds.   Pulmonary:      Effort: No accessory muscle usage or respiratory distress.      Breath sounds: Normal breath sounds. No wheezing, rhonchi or rales.   Abdominal:      General: Abdomen is flat. Bowel sounds are normal. There is no distension.      Palpations: Abdomen is soft.      Tenderness: There is generalized abdominal tenderness. There is no right CVA tenderness or left CVA tenderness.   Genitourinary:     Labia:         Right: No rash or lesion.         Left: No rash or lesion.       Vagina: Vaginal discharge present.      Uterus: Normal.       Adnexa:         Right: No mass or tenderness.          Left: No mass or tenderness.        Rectum: Normal.      Comments: Due to patient's discomfort, discontinued speculum exam though there was noted vaginal discharge in the canal.  Was able to perform a bimanual exam revealing no CMT, adnexal mass or tenderness bilaterally  Musculoskeletal:         General: No swelling or deformity. Normal range of motion.      Cervical back: Normal range of motion and neck supple.      Right lower leg: No edema.      Left lower leg: No edema.   Skin:     General: Skin is warm and dry.      Capillary Refill: Capillary refill takes less than 2 seconds.   Neurological:      General: No focal deficit present.      Mental Status: She is alert and oriented to person, place, and time.      GCS: GCS eye subscore is 4. GCS verbal subscore is 5. GCS motor subscore is 6.      Cranial Nerves: Cranial nerves 2-12 are intact.      Sensory: No sensory deficit.       Motor: Motor function is intact. No weakness.   Psychiatric:         Mood and Affect: Mood and affect normal.         Speech: Speech normal.         Behavior: Behavior normal. Behavior is cooperative.           ED Course & MDM   ED Course as of 11/20/24 0309   Tue Nov 19, 2024 2209 Preg Test, Ur: Negative [ML]   Wed Nov 20, 2024 0307 US PELVIS TRANSABDOMINAL WITH TRANSVAGINAL  1. Endometrial thickness is within normal limits if the patient is in  the secretory phase of the menstrual cycle. Trace amount of fluid  within the cervix. Recommend correlation with vaginal discharge.  2. Mildly heterogeneous myometrium, without discrete mass or fibroid.  3. Bilateral ovaries are grossly unremarkable.   [ML]   0307 CT abdomen pelvis w IV contrast  1.  Normal caliber appendix.  2.  No evidence of intra-abdominal/pelvic fluid collections or  pneumoperitoneum.  3.  No definite renal/ureteral calculi or hydronephrosis bilaterally.  4.  There is an irregular small cystic structure within the right  adnexal region which could represent an involuting/resolving cyst.  5.  Fluid within the endometrial canal as described above.  This is  nonspecific.  If clinically warranted, a pelvic ultrasound may be  helpful for further evaluation.  6.  There is a subcentimeter left adrenal nodule as described above.   Statistically, this likely represents an adenoma.  7.  Other findings as stated above.   [ML]      ED Course User Index  [ML] Jewell Barahona PA-C         Diagnoses as of 11/20/24 0309   Pelvic pain   Cystitis   Adenoma of left adrenal gland                 No data recorded     [place] Coma Scale Score: 15 (11/19/24 1832 : Bee Banuelos RN)                           Medical Decision Making  Patient is a 24-year-old female presenting today for abdominal pain.  On exam, has generalized abdominal pain with mild guarding, no distention or rigidity.  On arrival, vital signs stable, afebrile.  Reports subjective fever and chills over  the past week.  No CVA tenderness noted concerning for pyelonephritis or nephrolithiasis.  Endorses some urinary symptoms, so we will get a UA to rule out UTI.  Reports inconsistent menstrual cycle and having chronic pelvic pain since her previous medical  in March of this year.  Pregnancy test is negative.  Attempted to perform a  exam though had to discontinue due to patient's tolerance.  Mild vaginal discharge noted on exam and swabs were obtained.  Wet prep negative for BV yeast or trichomonas.  She is low suspicion for STIs.  Was able to perform a bimanual exam without any CMT, adnexal pain or mass bilaterally.  Due to patient's chronic pelvic pain and being uncomfortable on exam, I did proceed with a transvaginal ultrasound to rule out acute processes considering she is experiencing some left lower pelvic pain over the past couple days radiating to her pelvic area.  Will also get a CT abdomen pelvis considering generalized abdominal CT with associated fevers.    UA showing mild leukocyte Estrace though with symptoms, will treat for cystitis and send off for culture.  CBC without white count or acute anemia.  CMP within normal limits.  COVID and flu negative.  Wet prep negative.  CT abdomen pelvis showing no evidence of acute abdominal/pelvic processes.  Does have some small cystic structures in the right adnexal region that could represent a cyst that is resolving.  Also has fluid in her endometrial canal in addition to incidental findings of a left adrenal nodule that likely represents an adenoma.  Will give patient primary care referral for follow-up of the adrenal nodule.  Transvaginal ultrasound shows endometrial thickness that could be in the secretory phase of the menstrual cycle with trace amounts of fluid in the cervix.  Mildly heterogeneous myometrium without discrete fluid or fibroids and bilateral ovaries are grossly unremarkable.  Discussed findings with patient.  She reports feeling  better after Toradol.  Will send patient home with naproxen and Macrobid for possible UTI.  Will also give patient referral to OB/GYN for chronic pelvic pain in addition to primary care referral.  Discussed return precautions with patient.        Procedure  Procedures     Jewell Barahona PA-C  11/20/24 4088

## 2024-12-20 ENCOUNTER — PHARMACY VISIT (OUTPATIENT)
Dept: PHARMACY | Facility: CLINIC | Age: 24
End: 2024-12-20
Payer: COMMERCIAL

## 2024-12-20 PROCEDURE — RXMED WILLOW AMBULATORY MEDICATION CHARGE

## 2025-03-17 ENCOUNTER — HOSPITAL ENCOUNTER (EMERGENCY)
Facility: HOSPITAL | Age: 25
Discharge: HOME | End: 2025-03-17
Attending: EMERGENCY MEDICINE
Payer: COMMERCIAL

## 2025-03-17 ENCOUNTER — PHARMACY VISIT (OUTPATIENT)
Dept: PHARMACY | Facility: CLINIC | Age: 25
End: 2025-03-17
Payer: COMMERCIAL

## 2025-03-17 ENCOUNTER — APPOINTMENT (OUTPATIENT)
Dept: RADIOLOGY | Facility: HOSPITAL | Age: 25
End: 2025-03-17
Payer: COMMERCIAL

## 2025-03-17 VITALS
OXYGEN SATURATION: 98 % | SYSTOLIC BLOOD PRESSURE: 112 MMHG | HEART RATE: 63 BPM | RESPIRATION RATE: 18 BRPM | TEMPERATURE: 97.6 F | DIASTOLIC BLOOD PRESSURE: 70 MMHG

## 2025-03-17 DIAGNOSIS — M54.9 ACUTE BACK PAIN, UNSPECIFIED BACK LOCATION, UNSPECIFIED BACK PAIN LATERALITY: Primary | ICD-10-CM

## 2025-03-17 LAB — PREGNANCY TEST URINE, POC: NEGATIVE

## 2025-03-17 PROCEDURE — RXMED WILLOW AMBULATORY MEDICATION CHARGE

## 2025-03-17 PROCEDURE — 2500000005 HC RX 250 GENERAL PHARMACY W/O HCPCS

## 2025-03-17 PROCEDURE — 72131 CT LUMBAR SPINE W/O DYE: CPT | Performed by: RADIOLOGY

## 2025-03-17 PROCEDURE — 72131 CT LUMBAR SPINE W/O DYE: CPT

## 2025-03-17 PROCEDURE — 81025 URINE PREGNANCY TEST: CPT

## 2025-03-17 PROCEDURE — 99284 EMERGENCY DEPT VISIT MOD MDM: CPT | Mod: 25 | Performed by: EMERGENCY MEDICINE

## 2025-03-17 PROCEDURE — 2500000001 HC RX 250 WO HCPCS SELF ADMINISTERED DRUGS (ALT 637 FOR MEDICARE OP)

## 2025-03-17 RX ORDER — LIDOCAINE 560 MG/1
1 PATCH PERCUTANEOUS; TOPICAL; TRANSDERMAL DAILY
Status: DISCONTINUED | OUTPATIENT
Start: 2025-03-17 | End: 2025-03-17 | Stop reason: HOSPADM

## 2025-03-17 RX ORDER — LIDOCAINE 560 MG/1
1 PATCH PERCUTANEOUS; TOPICAL; TRANSDERMAL DAILY
Qty: 7 PATCH | Refills: 0 | Status: SHIPPED | OUTPATIENT
Start: 2025-03-17

## 2025-03-17 RX ORDER — IBUPROFEN 400 MG/1
400 TABLET ORAL EVERY 6 HOURS
Qty: 28 TABLET | Refills: 0 | Status: SHIPPED | OUTPATIENT
Start: 2025-03-17 | End: 2025-03-24

## 2025-03-17 RX ORDER — METHOCARBAMOL 500 MG/1
500 TABLET, FILM COATED ORAL EVERY 6 HOURS SCHEDULED
Status: DISCONTINUED | OUTPATIENT
Start: 2025-03-17 | End: 2025-03-17

## 2025-03-17 RX ORDER — METHOCARBAMOL 500 MG/1
500 TABLET, FILM COATED ORAL ONCE
Status: COMPLETED | OUTPATIENT
Start: 2025-03-17 | End: 2025-03-17

## 2025-03-17 RX ORDER — IBUPROFEN 400 MG/1
400 TABLET ORAL ONCE
Status: COMPLETED | OUTPATIENT
Start: 2025-03-17 | End: 2025-03-17

## 2025-03-17 RX ADMIN — LIDOCAINE 1 PATCH: 4 PATCH TOPICAL at 15:16

## 2025-03-17 RX ADMIN — METHOCARBAMOL 500 MG: 500 TABLET ORAL at 15:16

## 2025-03-17 RX ADMIN — IBUPROFEN 400 MG: 400 TABLET ORAL at 15:16

## 2025-03-17 NOTE — DISCHARGE INSTRUCTIONS
The scan that we did of your back was negative for any broken bones.  You most likely have some bruising and muscle strain in the back.      Return to the emergency department for any new or worsening symptoms.

## 2025-03-17 NOTE — ED PROVIDER NOTES
HPI   Chief Complaint   Patient presents with    Back Pain       24-year-old female no pertinent past medical history reports emergency department for chief complaint of back pain x 2 weeks.  Patient reports that they had a mechanical fall about 2 weeks ago and states that their back still hurts after this fall.  Patient denies having taken anything for alleviation of symptoms at home.  Denies paresthesias, incontinence, gait abnormalities, weakness.  Denies anticoagulation use, head injury.              Patient History   No past medical history on file.  No past surgical history on file.  No family history on file.  Social History     Tobacco Use    Smoking status: Never    Smokeless tobacco: Never   Substance Use Topics    Alcohol use: Never    Drug use: Never       Physical Exam   ED Triage Vitals [03/17/25 1435]   Temperature Heart Rate Respirations BP   36.4 °C (97.6 °F) 63 18 112/70      Pulse Ox Temp src Heart Rate Source Patient Position   98 % -- -- --      BP Location FiO2 (%)     -- --       Physical Exam  Vitals and nursing note reviewed.   Constitutional:       General: She is not in acute distress.     Appearance: Normal appearance. She is normal weight. She is not ill-appearing or toxic-appearing.   HENT:      Head: Normocephalic and atraumatic.   Cardiovascular:      Rate and Rhythm: Normal rate and regular rhythm.      Heart sounds: Normal heart sounds. No murmur heard.     No friction rub. No gallop.   Pulmonary:      Effort: Pulmonary effort is normal. No respiratory distress.      Breath sounds: Normal breath sounds. No stridor. No wheezing, rhonchi or rales.   Musculoskeletal:      Comments: No C-spine or T-spine tenderness in the midline regions on palpation.  Mild L-spine tenderness in the midline regions on palpation.  No palpable step-offs or deformity.  Mild paraspinal lower lumbar tenderness bilaterally.       Neurological:      General: No focal deficit present.      Mental Status: She is  alert.      Motor: No weakness.      Gait: Gait normal.      Comments: 5 out of 5 strength of bilateral lower extremities.  Sensation intact to light touch diffusely.  Gait normal.           ED Course & MDM   Diagnoses as of 03/17/25 1545   Acute back pain, unspecified back location, unspecified back pain laterality                 No data recorded     [place] Coma Scale Score: 15 (03/17/25 1435 : Zahira Holden RN)                           Medical Decision Making  24-year-old female presents ED for chief complaint of mechanical fall that occurred 2 weeks ago.  Patient on exam does have some mild tenderness in the T-spine and L-spine regions.  No C-spine tenderness.  With this in mind given patient's exam and clinical picture will obtain CTs of the L-spine to rule out acute osseous injury.  Patient will be medicated for pain in the emergency department and reevaluated.    3:46 PM CT of the L-spine negative.  Advised patient of these findings and they did show understanding.  Patient  did report some symptomatic improvement while here in the ED.  Advised patient of symptomatic management moving forward as well as return precautions.  Patient is overall well-appearing at this time with stable vital signs here in the emergency department and does not appear in acute distress.  Patient did have opportunity to ask questions some of them answered and no further complaints at this time and was amenable to plan moving forward for discharge.        Procedure  Procedures     Kodak Guerrero PA-C  03/17/25 1547

## 2025-03-17 NOTE — ED TRIAGE NOTES
Pt states she had a mechanical fall 2 weeks ago and is still having back pain. Pt states its fluent through the lower back. Denies numbness/tingling, loss of bowel/bladder, unsteady gait. She does endorse difficulty standing for lengths of time but denies weakness in legs at baseline.